# Patient Record
Sex: MALE | Race: WHITE | NOT HISPANIC OR LATINO | Employment: UNEMPLOYED | URBAN - NONMETROPOLITAN AREA
[De-identification: names, ages, dates, MRNs, and addresses within clinical notes are randomized per-mention and may not be internally consistent; named-entity substitution may affect disease eponyms.]

---

## 2020-02-19 ENCOUNTER — OFFICE VISIT (OUTPATIENT)
Dept: ORTHOPEDIC SURGERY | Facility: CLINIC | Age: 70
End: 2020-02-19

## 2020-02-19 VITALS — WEIGHT: 295 LBS | BODY MASS INDEX: 39.96 KG/M2 | TEMPERATURE: 97.4 F | HEIGHT: 72 IN

## 2020-02-19 DIAGNOSIS — M17.0 PRIMARY OSTEOARTHRITIS OF BOTH KNEES: Primary | ICD-10-CM

## 2020-02-19 PROCEDURE — 99204 OFFICE O/P NEW MOD 45 MIN: CPT | Performed by: PHYSICIAN ASSISTANT

## 2020-02-19 RX ORDER — SITAGLIPTIN 100 MG/1
100 TABLET, FILM COATED ORAL DAILY
COMMUNITY
Start: 2020-01-02

## 2020-02-19 RX ORDER — CARVEDILOL 3.12 MG/1
3.12 TABLET ORAL 2 TIMES DAILY WITH MEALS
COMMUNITY

## 2020-02-19 RX ORDER — CITALOPRAM 40 MG/1
TABLET ORAL
COMMUNITY
Start: 2020-02-07

## 2020-02-19 RX ORDER — TIZANIDINE 4 MG/1
TABLET ORAL
COMMUNITY
Start: 2020-02-09

## 2020-02-19 RX ORDER — GABAPENTIN 600 MG/1
TABLET ORAL
COMMUNITY
Start: 2020-01-27

## 2020-02-19 RX ORDER — CANDESARTAN CILEXETIL AND HYDROCHLOROTHIAZIDE 32; 25 MG/1; MG/1
1 TABLET ORAL DAILY
COMMUNITY
Start: 2020-01-31

## 2020-02-19 NOTE — PROGRESS NOTES
{AS New Follow Up Note Header:27089}    Patient: Kyler Kauffman  ?  YOB: 1950    MRN: [id number]  ?  Chief Complaint   Patient presents with   • Right Knee - Establish Care, Pain   • Left Knee - Establish Care, Pain      ?  HPI:   Kyler Lazar      Pain Location: {[facility] body part:20204}  Radiation: {asradiationpain:76759}  Quality: {asquality:91356}  Intensity/Severity: {asseveritypain:32901}  Duration: {Time:23956}  Onset quality: {asonsetquality:23204}  Timing: {astimin}  Aggravating Factors: {AS Aggravating Factors Ortho:72569}  Alleviating Factors: {AS Alleviating Factors:42738}  Previous Episodes: {aspreviousepisodes:79930}  Associated Symptoms: {asassociatedsymptoms:51535}  ADLs Affected: {ADL ASMEH:63731}  Previous Treatment: ***    This patient is {new/est pt:2644725089}.  This problem {AS IS/NOT:59013} new to this examiner.      Allergies: Allergies not on file    Medications:   Home Medications:  Current Outpatient Medications on File Prior to Visit   Medication Sig   • Candesartan Cilexetil-HCTZ 32-25 MG tablet Take 1 tablet by mouth Daily.   • carvedilol (COREG) 3.125 MG tablet Take 3.125 mg by mouth 2 (Two) Times a Day With Meals.   • citalopram (CeleXA) 40 MG tablet    • folic acid-vit B6-vit B12 (FOLTABS) 0.8-10-0.115 MG tablet tablet Take  by mouth Daily.   • gabapentin (NEURONTIN) 600 MG tablet TAKE 1 & 1 2 (ONE & ONE HALF) TABLETS BY MOUTH THREE TIMES DAILY   • JANUVIA 100 MG tablet Take 100 mg by mouth Daily.   • metFORMIN (GLUCOPHAGE) 1000 MG tablet Take 1,000 mg by mouth 2 (Two) Times a Day With Meals.   • tiZANidine (ZANAFLEX) 4 MG tablet      No current facility-administered medications on file prior to visit.      Current Medications:  Scheduled Meds:  PRN Meds:.    I have reviewed the patient's medical history in detail and updated the computerized patient record.  Review and summarization of old records include:    Past Medical History:   Diagnosis  "Date   • Back pain    • Diabetes (CMS/HCC)    • Neck pain      Past Surgical History:   Procedure Laterality Date   • APPENDECTOMY  1968   • HAND SURGERY  2005     Social History     Occupational History   • Not on file   Tobacco Use   • Smoking status: Never Smoker   Substance and Sexual Activity   • Alcohol use: Never     Frequency: Never   • Drug use: Not on file   • Sexual activity: Not on file      No family history on file.      Review of Systems       Wt Readings from Last 3 Encounters:   02/19/20 134 kg (295 lb)     Ht Readings from Last 3 Encounters:   02/19/20 182.9 cm (72\")     Body mass index is 40.01 kg/m².  Facility age limit for growth percentiles is 20 years.  Vitals:    02/19/20 1039   Temp: 97.4 °F (36.3 °C)         Physical Exam  ***      Ortho Exam:   {Musculoskeletal Exams:41568}    {Post Op Total Joint Arthroplasty Exam:15666}    {Post Op Detailed Exam:82627}      Diagnostics:  {Diagnostics options AS:67813}      Assessment:  There are no diagnoses linked to this encounter.      Procedures  ?    Plan    · Compression/brace  · Rest, ice, compression, and elevation (RICE) therapy  · Stretching and strengthening exercises  · {OTC pain:20513}  · Follow up in *** {WEEKS/MONTHS:70428}    Date of encounter: 02/19/2020   Roxie Torres MA  "

## 2020-02-28 PROBLEM — E11.9 DIABETES MELLITUS: Status: ACTIVE | Noted: 2019-08-30

## 2020-02-28 PROBLEM — M17.0 PRIMARY OSTEOARTHRITIS OF BOTH KNEES: Status: ACTIVE | Noted: 2020-02-28

## 2020-02-28 RX ORDER — OMEPRAZOLE 40 MG/1
CAPSULE, DELAYED RELEASE ORAL
COMMUNITY

## 2020-02-28 RX ORDER — ALBUTEROL SULFATE 2.5 MG/3ML
3 SOLUTION RESPIRATORY (INHALATION) EVERY 8 HOURS SCHEDULED
COMMUNITY

## 2020-02-28 RX ORDER — POTASSIUM CHLORIDE 20 MEQ/1
20 TABLET, EXTENDED RELEASE ORAL DAILY
COMMUNITY
Start: 2020-01-31

## 2020-02-28 RX ORDER — VALSARTAN AND HYDROCHLOROTHIAZIDE 320; 25 MG/1; MG/1
TABLET, FILM COATED ORAL
COMMUNITY
End: 2022-03-01 | Stop reason: ALTCHOICE

## 2020-02-28 RX ORDER — RANITIDINE 300 MG/1
TABLET ORAL
COMMUNITY
End: 2022-03-01 | Stop reason: ALTCHOICE

## 2020-02-28 RX ORDER — FUROSEMIDE 40 MG/1
TABLET ORAL
COMMUNITY

## 2020-02-28 NOTE — PROGRESS NOTES
NEW VISIT    Patient: Kyler Kauffman  ?  YOB: 1950    MRN: [id number]  ?  Chief Complaint   Patient presents with   • Right Knee - Establish Care, Pain   • Left Knee - Establish Care, Pain      ?  HPI:   69 y.o. male presents with bilateral knee pain for many years. He reports he has adjusted his way of like because of his knee pain. He would like to be able to walk to his garden and to his mailbox without difficulty. He does state he is unable to fully extend the knees due to the way he has walked for so many years, it has always been more comfortable to walk with a bend in his knees.    Pain Location: bilateral knee (right worse than left)  Radiation: medial face of tibia  Quality: grinding  Intensity/Severity: severe  Duration: 10 years  Progression of symptoms: yes, progressive worsening over the last 5 years  Onset quality: gradual   Timing: intermittent  Aggravating Factors: rising after sitting, walking, standing  Alleviating Factors: rest  Previous Episodes: yes  Associated Symptoms: pain, clicking/popping, decreased ROM  ADLs Affected: ambulating, recreational activities/sports  Previous Treatment: NSAIDs      This patient is a new patient.  This problem is new to this examiner.      Allergies: Allergies not on file    Medications:   Home Medications:  Current Outpatient Medications on File Prior to Visit   Medication Sig   • Candesartan Cilexetil-HCTZ 32-25 MG tablet Take 1 tablet by mouth Daily.   • carvedilol (COREG) 3.125 MG tablet Take 3.125 mg by mouth 2 (Two) Times a Day With Meals.   • citalopram (CeleXA) 40 MG tablet    • folic acid-vit B6-vit B12 (FOLTABS) 0.8-10-0.115 MG tablet tablet Take  by mouth Daily.   • gabapentin (NEURONTIN) 600 MG tablet TAKE 1 & 1 2 (ONE & ONE HALF) TABLETS BY MOUTH THREE TIMES DAILY   • JANUVIA 100 MG tablet Take 100 mg by mouth Daily.   • metFORMIN (GLUCOPHAGE) 1000 MG tablet Take 1,000 mg by mouth 2 (Two) Times a Day With Meals.   • tiZANidine  (ZANAFLEX) 4 MG tablet    • albuterol (PROVENTIL) (2.5 MG/3ML) 0.083% nebulizer solution 3 mL Every 8 (Eight) Hours.   • furosemide (LASIX) 40 MG tablet Lasix 40 mg tablet   Take 1 tablet every day by oral route.   • hydroCHLOROthiazide (MICROZIDE PO) hydrochlorothiazide   • mometasone-formoterol (DULERA) 100-5 MCG/ACT inhaler Every 12 (Twelve) Hours.   • omeprazole (priLOSEC) 40 MG capsule omeprazole 40 mg capsule,delayed release   Take 1 capsule every day by oral route as needed.   • potassium chloride (K-DUR,KLOR-CON) 20 MEQ CR tablet Take 20 mEq by mouth Daily.   • raNITIdine (ZANTAC) 300 MG tablet Zantac 300 mg tablet   Take 1 tablet every day by oral route as needed.   • valsartan-hydrochlorothiazide (DIOVAN HCT) 320-25 MG per tablet Diovan  mg-25 mg tablet   Take 1 tablet every day by oral route.     No current facility-administered medications on file prior to visit.      Current Medications:  Scheduled Meds:  PRN Meds:.    I have reviewed the patient's medical history in detail and updated the computerized patient record.  Review and summarization of old records include:    Past Medical History:   Diagnosis Date   • Back pain    • Diabetes (CMS/HCC)    • Neck pain    • Neuropathy      Past Surgical History:   Procedure Laterality Date   • APPENDECTOMY  1968   • HAND SURGERY  2005     Social History     Occupational History   • Not on file   Tobacco Use   • Smoking status: Never Smoker   Substance and Sexual Activity   • Alcohol use: Never     Frequency: Never   • Drug use: Not on file   • Sexual activity: Not on file     No family history on file.      Review of Systems  Constitutional: Negative.  Negative for fever.   Eyes: Negative.    Respiratory: Negative.    Cardiovascular: Negative.    Endocrine: Negative.    Musculoskeletal: Positive for arthralgias, gait problem and joint swelling.   Skin: Negative.  Negative for rash and wound.   Allergic/Immunologic: Negative.    Neurological: Negative for  "numbness.   Hematological: Negative.    Psychiatric/Behavioral: Negative.         Wt Readings from Last 3 Encounters:   02/19/20 134 kg (295 lb)     Ht Readings from Last 3 Encounters:   02/19/20 182.9 cm (72\")     Body mass index is 40.01 kg/m².  Facility age limit for growth percentiles is 20 years.  Vitals:    02/19/20 1039   Temp: 97.4 °F (36.3 °C)         Physical Exam  Constitutional: Patient is oriented to person, place, and time. Appears well-developed and well-nourished.   HENT:   Head: Normocephalic and atraumatic.   Eyes: Conjunctivae and EOM are normal. Pupils are equal, round, and reactive to light.   Cardiovascular: Normal rate and intact distal pulses.   Pulmonary/Chest: Effort normal and breath sounds normal.   Musculoskeletal:   See detailed exam below   Neurological: Alert and oriented to person, place, and time. No sensory deficit. Coordination normal.   Skin: Skin is warm and dry. Capillary refill takes less than 2 seconds. No rash noted. No erythema.   Psychiatric: Patient has a normal mood and affect. His behavior is normal. Judgment and thought content normal.   Nursing note and vitals reviewed.      Ortho Exam:   There is moderate joint line tenderness at the medial aspect of the knee. The knee has a varus orientation.   Positive for crepitus throughout range of motion.   Positive for mild effusion.  Findings are consistent with synovitis and effusion.    Positive patellar grind test.   Negative Lachman test.    Negative anterior and posterior drawer.  Range of motion in extension and flexion is: right 5-100, left 5-105 degrees.  Neurovascular status is intact.  Dorsalis pedis and posterior tibial artery pulses are palpable. Common peroneal nerve function is well preserved.   Gait is cautious and antalgic.  Positive for Baker's Cyst with fullness and tenderness in the popliteal fossa.         Diagnostics:  Independently viewed and interpreted outside xrays of right knee and left knee, from " [facility] 1/13/19 and 1/13/20, my impression below:  Right Knee: KL grade 4, advanced tricompartmental osteoarthritis with complete loss of medial joint space and multiple areas of osteophyte formation  Left Knee: KL grade 4, significant osteophyte formation in the posterior aspect of the knee and in the patellofemoral compartment.  There is mild medial joint space narrowing.        Assessment:  Kyler was seen today for establish care, pain, establish care and pain.    Diagnoses and all orders for this visit:    Primary osteoarthritis of both knees  -     Visco Treatment; Future        Plan    · Risk, benefits, and merits of treatment alternatives reviewed with the patient and/or guardian and questions answered  · Regular exercise as tolerated  · Ice, heat, and/or modalities as beneficial  · Weight bearing parameters reviewed  · Discussed joint injection with steroid and monovisc.    · Dicussed bracing and fitted for knee brace in office.  · Discussed surgical options with TKA bilaterally. He would like to try conservative measures with monovisc injection first, which I agree with. Will request insurance approval for Monovisc.  · Rest, ice, compression, and elevation (RICE) therapy  · Alternate OTC Ibuprofen and Tylenol as needed/if clinically indicated  · Once Monovisc is approved      Shayan Donnelly PA-C  Date of encounter: 02/19/2020     Electronically signed by Shayan Donnelly PA-C, 02/28/20, 9:36 AM.

## 2020-03-05 ENCOUNTER — TELEPHONE (OUTPATIENT)
Dept: ORTHOPEDIC SURGERY | Facility: CLINIC | Age: 70
End: 2020-03-05

## 2020-03-05 NOTE — TELEPHONE ENCOUNTER
PATIENT CALLED CHECKING STATUS OF VISCO APPROVAL THROUGH [facility] PHONE NUMBER [phone] ext [phone]

## 2020-03-06 NOTE — TELEPHONE ENCOUNTER
I TRIED TO CALL PAT BACK AND THE PHONE WAS OUT OF SERVICE  I HAVE SENT IN A PA FOR AN APPROVAL FOR THE MONOVISC INJECTIONS JUST WAITING ON A REPLY AND VERIFICATION IT IS COVERED

## 2020-03-10 ENCOUNTER — TELEPHONE (OUTPATIENT)
Dept: ORTHOPEDIC SURGERY | Facility: CLINIC | Age: 70
End: 2020-03-10

## 2020-03-10 NOTE — TELEPHONE ENCOUNTER
PATIENT CALLED AND HE HAS BEEN SCHEDULED FOR AN INJECTION ON 3/16/20.  HIS PHONE NUMBER HAS BEEN UPDATED

## 2020-03-10 NOTE — TELEPHONE ENCOUNTER
I TRIED TO CALL THE PATIENT TO LET HIM KNOW THE MONOVISC INJECTION WAS APPROVED AND SET HIM UP AN APPOINTMENT BUT IT SAYS THE PHONE IS NO LONGER IN SERVICE

## 2020-03-16 ENCOUNTER — CLINICAL SUPPORT (OUTPATIENT)
Dept: ORTHOPEDIC SURGERY | Facility: CLINIC | Age: 70
End: 2020-03-16

## 2020-03-16 VITALS — BODY MASS INDEX: 40.36 KG/M2 | WEIGHT: 298 LBS | HEIGHT: 72 IN | TEMPERATURE: 97.3 F

## 2020-03-16 DIAGNOSIS — M17.11 PRIMARY OSTEOARTHRITIS OF RIGHT KNEE: Primary | ICD-10-CM

## 2020-03-16 DIAGNOSIS — M17.12 PRIMARY OSTEOARTHRITIS OF LEFT KNEE: ICD-10-CM

## 2020-03-16 PROCEDURE — 20610 DRAIN/INJ JOINT/BURSA W/O US: CPT | Performed by: PHYSICIAN ASSISTANT

## 2020-03-16 RX ORDER — LIDOCAINE HYDROCHLORIDE 10 MG/ML
2 INJECTION, SOLUTION EPIDURAL; INFILTRATION; INTRACAUDAL; PERINEURAL
Status: COMPLETED | OUTPATIENT
Start: 2020-03-16 | End: 2020-03-16

## 2020-03-16 RX ORDER — GLIPIZIDE 10 MG/1
TABLET ORAL
COMMUNITY

## 2020-03-16 RX ADMIN — LIDOCAINE HYDROCHLORIDE 2 ML: 10 INJECTION, SOLUTION EPIDURAL; INFILTRATION; INTRACAUDAL; PERINEURAL at 10:33

## 2020-03-16 NOTE — PROGRESS NOTES
Procedure   Large Joint Arthrocentesis: R knee  Date/Time: 3/16/2020 10:33 AM  Consent given by: patient  Site marked: site marked  Timeout: Immediately prior to procedure a time out was called to verify the correct patient, procedure, equipment, support staff and site/side marked as required   Supporting Documentation  Indications: pain and joint swelling   Procedure Details  Location: knee - R knee  Preparation: Patient was prepped and draped in the usual sterile fashion  Needle size: 18 G  Approach: anteromedial  Medications administered: 2 mL lidocaine PF 1% 1 %; 88 mg Hyaluronan 88 MG/4ML  Patient tolerance: patient tolerated the procedure well with no immediate complications    Large Joint Arthrocentesis: L knee  Date/Time: 3/16/2020 10:33 AM  Consent given by: patient  Site marked: site marked  Timeout: Immediately prior to procedure a time out was called to verify the correct patient, procedure, equipment, support staff and site/side marked as required   Supporting Documentation  Indications: pain and joint swelling   Procedure Details  Location: knee - L knee  Preparation: Patient was prepped and draped in the usual sterile fashion  Needle size: 18 G  Approach: anteromedial  Medications administered: 2 mL lidocaine PF 1% 1 %; 88 mg Hyaluronan 88 MG/4ML  Patient tolerance: patient tolerated the procedure well with no immediate complications      Electronically signed by Shayan Donnelly PA-C, 03/16/20, 10:38 AM.

## 2020-03-16 NOTE — PROGRESS NOTES
INJECTION      Patient: Kyler Kauffman    MRN: [id number]    YOB: 1950    Chief Complaint   Patient presents with   • Right Knee - Follow-up, Pain   • Left Knee - Follow-up, Pain   • Injections         History of Present Illness:  Kyler Kauffman is here today for injection therapy. He is receiving first time  bilateral knee injections. At my initial visit with him on 2/19/2020 we discussed treatment options for his knee pain and decided upon trying Monovisc injections. Options have been discussed and he understands and consents.       Physical Exam:   69 y.o. male awake, alert, oriented, in no acute distress and well developed, well nourished.  There is moderate joint line tenderness at the medial aspect of the knee. The knee has a varus orientation.   Positive for crepitus throughout range of motion.   Positive for mild effusion.  Findings are consistent with synovitis and effusion.    Positive patellar grind test.   Negative Lachman test.    Negative anterior and posterior drawer.  Range of motion in extension and flexion is: 5-100 (right), 5-105 (left) degrees.  Neurovascular status is intact.  Dorsalis pedis and posterior tibial artery pulses are palpable. Common peroneal nerve function is well preserved.   Gait is cautious and antalgic.  Positive for Baker's Cyst with fullness and tenderness in the popliteal fossa.       Procedure:  See separate procedure note    Injection site was identified by physical examination and cleaned with Betadine and alcohol swabs. Prior to needle insertion, ethyl chloride spray was used for surface anesthesia. Sterile technique was used.       ASSESSMENT:  Kyler was seen today for injections, follow-up, pain, follow-up and pain.    Diagnoses and all orders for this visit:    Primary osteoarthritis of right knee  -     Large Joint Arthrocentesis: R knee  -     lidocaine PF 1% (XYLOCAINE) injection 2 mL  -     Hyaluronan (MONOVISC) injection 88 mg  -      Visco Treatment; Future    Primary osteoarthritis of left knee  -     Large Joint Arthrocentesis: L knee  -     lidocaine PF 1% (XYLOCAINE) injection 2 mL  -     Hyaluronan (MONOVISC) injection 88 mg  -     Visco Treatment; Future          PLAN:   • Bilateral knee Monovisc injection was discussed with the patient. Discussed indication, risks, benefits, and alternatives. Verbal consent was given to proceed with the procedure.   • Injection was performed from anteromedial approach.  Patient tolerated the procedure well and no complications were encountered.  • Discussion of orthopedic goals and activities and patient/guardian expressed understanding.  • Ice, heat, rest, compression and elevation of extremity as beneficial  • nsaids and/or tylenol as beneficial  • Instructed to refrain from heavy activity/rest the extremity for the next 24-48 hours  • Discussion regarding possibility of cortisol flare and what to expect if this occurs  • Watch for signs and symptoms of infection  • Call if adverse effect from injection therapy  • Follow up in 6 months unless he does not experience relief with the injections at which point we will decide to change to steroid injections    Shayan Donnelly PA-C  Encounter Date: 3/16/2020    Electronically signed by Shayan Donnelly PA-C, 03/16/20, 10:39 AM.      EMR Dragon/Transcription disclaimer:  Much of this encounter note is an electronic transcription/translation of spoken language to printed text. The electronic translation of spoken language may permit erroneous, or at times, nonsensical words or phrases to be inadvertently transcribed; Although I have reviewed the note for such errors, some may still exist.

## 2020-09-21 ENCOUNTER — CLINICAL SUPPORT (OUTPATIENT)
Dept: ORTHOPEDIC SURGERY | Facility: CLINIC | Age: 70
End: 2020-09-21

## 2020-09-21 VITALS — TEMPERATURE: 96.4 F | WEIGHT: 280 LBS | HEIGHT: 72 IN | BODY MASS INDEX: 37.93 KG/M2

## 2020-09-21 DIAGNOSIS — M17.11 PRIMARY OSTEOARTHRITIS OF RIGHT KNEE: Primary | ICD-10-CM

## 2020-09-21 DIAGNOSIS — M17.12 PRIMARY OSTEOARTHRITIS OF LEFT KNEE: ICD-10-CM

## 2020-09-21 PROCEDURE — 20610 DRAIN/INJ JOINT/BURSA W/O US: CPT | Performed by: PHYSICIAN ASSISTANT

## 2020-09-21 RX ORDER — HYDROCODONE BITARTRATE AND ACETAMINOPHEN 10; 325 MG/1; MG/1
TABLET ORAL
COMMUNITY
Start: 2020-06-19 | End: 2022-03-01 | Stop reason: ALTCHOICE

## 2020-09-21 RX ORDER — LIDOCAINE HYDROCHLORIDE 10 MG/ML
2 INJECTION, SOLUTION INFILTRATION; PERINEURAL
Status: COMPLETED | OUTPATIENT
Start: 2020-09-21 | End: 2020-09-21

## 2020-09-21 RX ADMIN — LIDOCAINE HYDROCHLORIDE 2 ML: 10 INJECTION, SOLUTION INFILTRATION; PERINEURAL at 09:12

## 2020-09-21 RX ADMIN — LIDOCAINE HYDROCHLORIDE 2 ML: 10 INJECTION, SOLUTION INFILTRATION; PERINEURAL at 09:13

## 2020-09-21 NOTE — PROGRESS NOTES
Procedure   Large Joint Arthrocentesis: R knee  Date/Time: 9/21/2020 9:12 AM  Consent given by: patient  Site marked: site marked  Timeout: Immediately prior to procedure a time out was called to verify the correct patient, procedure, equipment, support staff and site/side marked as required   Supporting Documentation  Indications: pain and joint swelling   Procedure Details  Location: knee - R knee  Preparation: Patient was prepped and draped in the usual sterile fashion  Needle size: 18 G  Approach: anteromedial  Medications administered: 88 mg Hyaluronan 88 MG/4ML; 2 mL lidocaine 1 %  Patient tolerance: patient tolerated the procedure well with no immediate complications    Large Joint Arthrocentesis: L knee  Date/Time: 9/21/2020 9:13 AM  Consent given by: patient  Site marked: site marked  Timeout: Immediately prior to procedure a time out was called to verify the correct patient, procedure, equipment, support staff and site/side marked as required   Supporting Documentation  Indications: pain and joint swelling   Procedure Details  Location: knee - L knee  Preparation: Patient was prepped and draped in the usual sterile fashion  Needle size: 18 G  Approach: anteromedial  Medications administered: 88 mg Hyaluronan 88 MG/4ML; 2 mL lidocaine 1 %  Patient tolerance: patient tolerated the procedure well with no immediate complications      Electronically signed by Shayan Donnelly PA-C, 09/21/20, 10:30 AM EDT.

## 2020-09-21 NOTE — PROGRESS NOTES
INJECTION      Patient: Kyler Kauffman    MRN: [id number]    YOB: 1950    Chief Complaint   Patient presents with   • Left Knee - Follow-up, Pain   • Right Knee - Follow-up, Pain   • Injections         History of Present Illness:  Kyler Kauffman is here today for injection therapy. He is receiving second time bilateral knee injections of Monovisc.  He reports significant improvement with the injections and states they started to wear off about 1 month ago.  Options have been discussed and he understands and consents.       Physical Exam:   69 y.o. male awake, alert, oriented, in no acute distress and well developed, well nourished.  BILATERAL knee  · There is moderate joint line tenderness at the medial aspect of both knees.   · Positive for crepitus throughout range of motion.   · Positive for mild effusion.  Findings are consistent with synovitis and effusion.    · Positive patellar grind test.   · Negative Lachman test.    · Negative anterior and posterior drawer.  · Range of motion in extension and flexion is: 5-100 (right), 5-105 (left) degrees.  · Neurovascular status is intact.  Dorsalis pedis and posterior tibial artery pulses are palpable. Common peroneal nerve function is well preserved.   · Gait is cautious and antalgic.  · Positive for Baker's Cyst with fullness and tenderness in the popliteal fossa.   The above information is historical, all information remains the same after examining today.      Procedure:  See separate procedure note  Injection site was identified by physical examination and cleaned with Betadine and alcohol swabs. Prior to needle insertion, ethyl chloride spray was used for surface anesthesia. Sterile technique was used.       ASSESSMENT:  Kyler was seen today for injections, follow-up, pain, follow-up and pain.    Diagnoses and all orders for this visit:    Primary osteoarthritis of right knee  -     Visco Treatment; Future    Primary osteoarthritis of left  knee  -     Visco Treatment; Future    Other orders  -     Large Joint Arthrocentesis: R knee  -     Large Joint Arthrocentesis: L knee          PLAN:   • Bilateral knee Monovisc injections were discussed with the patient. Discussed indication, risks, benefits, and alternatives. Verbal consent was given to proceed with the procedure.   • Injections were performed from anteromedial approach.  Patient tolerated the procedures well and no complications were encountered.  • Discussion of orthopedic goals and activities and patient/guardian expressed understanding.  • Ice, heat, rest, compression and elevation of extremity as beneficial  • nsaids and/or tylenol as beneficial  • Instructed to refrain from heavy activity/rest the extremity for the next 24-48 hours  • Discussion regarding possibility of cortisol flare and what to expect if this occurs  • Watch for signs and symptoms of infection  • Call if adverse effect from injection therapy  · Follow up in 6 months for Monovisc    Shayan Donnelly PA-C  Encounter Date: 9/21/2020  Electronically signed by Shayan Donnelly PA-C, 09/21/20, 10:29 AM EDT.          EMR Dragon/Transcription disclaimer:  Much of this encounter note is an electronic transcription/translation of spoken language to printed text. The electronic translation of spoken language may permit erroneous, or at times, nonsensical words or phrases to be inadvertently transcribed; Although I have reviewed the note for such errors, some may still exist.

## 2021-03-16 ENCOUNTER — TELEPHONE (OUTPATIENT)
Dept: ORTHOPEDIC SURGERY | Facility: CLINIC | Age: 71
End: 2021-03-16

## 2021-03-16 NOTE — TELEPHONE ENCOUNTER
Caller:  LEENA CANSECOTINGLY     Relationship: SELF    Best call back number: [phone]    Additional notes: PATIENT WOULD LIKE A CALL BACK TO DISCUSS INJECTION APPT ON 3-29-21.  HE NEEDS TO CONFIRM IF PRACTICE HAS [facility] AUTHORIZATION ON FILE, AND IF NOT, HE WILL NEED TO RESCHEDULE.  [facility] N[facility]PT WITH [facility] IS ON 4-13-21

## 2021-03-16 NOTE — TELEPHONE ENCOUNTER
CALLED PATIENT AND HAVE RESCHEDULED HIS APPOINTMENT TO 5/04/21 AND PATIENT WILL OBTAIN UPDATED REFERRAL [facility]

## 2021-05-04 ENCOUNTER — CLINICAL SUPPORT (OUTPATIENT)
Dept: ORTHOPEDIC SURGERY | Facility: CLINIC | Age: 71
End: 2021-05-04

## 2021-05-04 VITALS — TEMPERATURE: 97.5 F | WEIGHT: 290 LBS | HEIGHT: 72 IN | BODY MASS INDEX: 39.28 KG/M2

## 2021-05-04 DIAGNOSIS — M17.12 PRIMARY OSTEOARTHRITIS OF LEFT KNEE: ICD-10-CM

## 2021-05-04 DIAGNOSIS — M17.11 PRIMARY OSTEOARTHRITIS OF RIGHT KNEE: ICD-10-CM

## 2021-05-04 PROCEDURE — 20610 DRAIN/INJ JOINT/BURSA W/O US: CPT | Performed by: PHYSICIAN ASSISTANT

## 2021-05-04 RX ORDER — MELOXICAM 7.5 MG/1
TABLET ORAL
COMMUNITY
Start: 2021-03-31

## 2021-05-04 RX ORDER — NAPROXEN 375 MG/1
375 TABLET ORAL 2 TIMES DAILY PRN
COMMUNITY

## 2021-05-04 NOTE — PROGRESS NOTES
"Chief Complaint  Follow-up and Pain of the Right Knee and Follow-up and Pain of the Left Knee    Subjective    History of Present Illness      Kyler Kauffman is a 70 y.o. male who presents to [facility] for is here today for injection therapy. He receives  BILATERAL knee injections of Monovisc. Since last injection, patient notes substantial relief of symptoms.  It has been greater than 6 months since his last injection therefore he is having significant discomfort today.  Treatment options have been discussed and he understands and consents.       Objective   Vital Signs:   Temp 97.5 °F (36.4 °C)   Ht 182.9 cm (72\")   Wt 132 kg (290 lb)   BMI 39.33 kg/m²     Physical Exam  70 y.o. male is awake, alert, oriented, in no acute distress and well developed, well nourished.  Ortho Exam   BILATERAL knee  · There is moderate joint line tenderness at the medial aspect of both knees.   · Positive for crepitus throughout range of motion.   · Positive for mild effusion.  Findings are consistent with synovitis and effusion.    · Positive patellar grind test.   · Negative Lachman test.    · Negative anterior and posterior drawer.  · Range of motion in extension and flexion is: 5-100 (right), 5-105 (left) degrees.  · Neurovascular status is intact.  Dorsalis pedis and posterior tibial artery pulses are palpable. Common peroneal nerve function is well preserved.   · Gait is cautious and antalgic.  · Positive for Baker's Cyst with fullness and tenderness in the popliteal fossa.         Result Review :         PROCEDURE  Procedures     Injection site was identified by physical examination and cleaned with Betadine and alcohol swabs. Prior to needle insertion, ethyl chloride spray was used for surface anesthesia. Sterile technique was used.       Assessment   Assessment and Plan    Problem List Items Addressed This Visit        Musculoskeletal and Injuries    Primary osteoarthritis of right knee "    Relevant Orders    Large Joint Arthrocentesis: R knee    Visco Treatment    Primary osteoarthritis of left knee    Relevant Orders    Large Joint Arthrocentesis: L knee    Visco Treatment          Follow Up   · Bilateral knee Monovisc injections were discussed with the patient. Discussed indication, risks, benefits, and alternatives. Verbal consent was given to proceed with the procedure.   · Injections were performed from anteromedial approach.  Patient tolerated the procedures well and no complications were encountered.  · Discussion of orthopedic goals and activities and patient/guardian expressed understanding.  · Ice, heat, rest, compression and elevation of extremity as beneficial  · nsaids and/or tylenol as beneficial  · Instructed to refrain from heavy activity/rest the extremity for the next 24-48 hours  · Discussion regarding possibility of cortisol flare and what to expect if this occurs  · Watch for signs and symptoms of infection  · Call if adverse effect from injection therapy  · Follow up in 6 months for Monovisc  • Patient was given instructions and counseling regarding his condition or for health maintenance advice. Please see specific information pulled into the AVS if appropriate.     Shayan Donnelly PA-C   Date of Encounter: 5/4/2021   Electronically signed by Shayan Donnelly PA-C, 05/04/21, 10:57 AM EDT.     EMR Dragon/Transcription disclaimer:  Much of this encounter note is an electronic transcription/translation of spoken language to printed text. The electronic translation of spoken language may permit erroneous, or at times, nonsensical words or phrases to be inadvertently transcribed; Although I have reviewed the note for such errors, some may still exist.

## 2021-05-04 NOTE — PROGRESS NOTES
Large Joint Arthrocentesis: R knee  Date/Time: 5/4/2021 10:38 AM  Consent given by: patient  Site marked: site marked  Timeout: Immediately prior to procedure a time out was called to verify the correct patient, procedure, equipment, support staff and site/side marked as required   Supporting Documentation  Indications: pain   Procedure Details  Location: knee - R knee  Preparation: Patient was prepped and draped in the usual sterile fashion  Needle size: 25 G  Approach: anteromedial  Medications administered: 88 mg Hyaluronan 88 MG/4ML; 2 mL lidocaine (cardiac)  Patient tolerance: patient tolerated the procedure well with no immediate complications    Large Joint Arthrocentesis: L knee  Date/Time: 5/4/2021 10:39 AM  Consent given by: patient  Site marked: site marked  Timeout: Immediately prior to procedure a time out was called to verify the correct patient, procedure, equipment, support staff and site/side marked as required   Supporting Documentation  Indications: pain   Procedure Details  Location: knee - L knee  Preparation: Patient was prepped and draped in the usual sterile fashion  Needle size: 25 G  Approach: anteromedial  Medications administered: 88 mg Hyaluronan 88 MG/4ML; 2 mL lidocaine (cardiac)  Patient tolerance: patient tolerated the procedure well with no immediate complications      Electronically signed by Shayan Donnelly PA-C, 05/04/21, 10:57 AM EDT.

## 2021-11-08 ENCOUNTER — CLINICAL SUPPORT (OUTPATIENT)
Dept: ORTHOPEDIC SURGERY | Facility: CLINIC | Age: 71
End: 2021-11-08

## 2021-11-08 VITALS — BODY MASS INDEX: 39.28 KG/M2 | WEIGHT: 290 LBS | HEIGHT: 72 IN | TEMPERATURE: 97.8 F

## 2021-11-08 DIAGNOSIS — M17.12 PRIMARY OSTEOARTHRITIS OF LEFT KNEE: ICD-10-CM

## 2021-11-08 DIAGNOSIS — M17.11 PRIMARY OSTEOARTHRITIS OF RIGHT KNEE: ICD-10-CM

## 2021-11-08 PROCEDURE — 20610 DRAIN/INJ JOINT/BURSA W/O US: CPT | Performed by: PHYSICIAN ASSISTANT

## 2021-11-08 NOTE — PROGRESS NOTES
"Chief Complaint  Follow-up and Pain of the Left Knee, Follow-up and Pain of the Right Knee, and Injections    Subjective    History of Present Illness      Kyler Kauffman is a 71 y.o. male who presents to [facility] for is here today for injection therapy. He receives  BILATERAL knee injections of Monovisc. Since last injection, patient notes substantial relief of symptoms but only for about 4 months. Treatment options have been discussed and he understands and consents.       Objective   Vital Signs:   Temp 97.8 °F (36.6 °C)   Ht 182.9 cm (72.01\")   Wt 132 kg (290 lb)   BMI 39.32 kg/m²     Physical Exam  71 y.o. male is awake, alert, oriented, in no acute distress and well developed, well nourished.  Ortho Exam   BILATERAL knee  There is moderate joint line tenderness at the medial aspect of both knees.   Positive for crepitus throughout range of motion.   Positive for mild effusion.  Findings are consistent with synovitis and effusion.    Positive patellar grind test.   Negative Lachman test.    Negative anterior and posterior drawer.  Range of motion in extension and flexion is: 5-100 (right), 5-105 (left) degrees.  Neurovascular status is intact.  Dorsalis pedis and posterior tibial artery pulses are palpable.   Common peroneal nerve function is well preserved.   Gait is cautious and antalgic.  Positive for Baker's Cyst with fullness and tenderness in the popliteal fossa.      Result Review :           PROCEDURE  Large Joint Arthrocentesis: R knee  Date/Time: 11/8/2021 9:36 AM  Consent given by: patient  Site marked: site marked  Timeout: Immediately prior to procedure a time out was called to verify the correct patient, procedure, equipment, support staff and site/side marked as required   Supporting Documentation  Indications: pain and joint swelling   Procedure Details  Location: knee - R knee  Preparation: Patient was prepped and draped in the usual sterile " fashion  Needle size: 18 G  Approach: anteromedial  Medications administered: 88 mg Hyaluronan 88 MG/4ML; 2 mL lidocaine (cardiac)  Patient tolerance: patient tolerated the procedure well with no immediate complications    Large Joint Arthrocentesis: L knee  Date/Time: 11/8/2021 9:37 AM  Consent given by: patient  Site marked: site marked  Timeout: Immediately prior to procedure a time out was called to verify the correct patient, procedure, equipment, support staff and site/side marked as required   Supporting Documentation  Indications: pain and joint swelling   Procedure Details  Location: knee - L knee  Preparation: Patient was prepped and draped in the usual sterile fashion  Needle size: 18 G  Approach: anteromedial  Medications administered: 88 mg Hyaluronan 88 MG/4ML; 2 mL lidocaine (cardiac)  Patient tolerance: patient tolerated the procedure well with no immediate complications        Injection site was identified by physical examination and cleaned with Betadine and alcohol swabs. Prior to needle insertion, ethyl chloride spray was used for surface anesthesia. Sterile technique was used.       Assessment   Assessment and Plan    Problem List Items Addressed This Visit        Musculoskeletal and Injuries    Primary osteoarthritis of right knee    Relevant Medications    Hyaluronan (MONOVISC) injection 88 mg (Completed)    lidocaine (cardiac) (XYLOCAINE) injection 2 mL (Completed)    Other Relevant Orders    Large Joint Arthrocentesis: R knee (Completed)    Visco Treatment    Primary osteoarthritis of left knee    Relevant Medications    Hyaluronan (MONOVISC) injection 88 mg (Completed)    lidocaine (cardiac) (XYLOCAINE) injection 2 mL (Completed)    Other Relevant Orders    Large Joint Arthrocentesis: L knee (Completed)    Visco Treatment          Follow Up   • Bilateral knee Monovisc injection was discussed with the patient. Discussed indication, risks, benefits, and alternatives. Verbal consent was given  to proceed with the procedure.   • Injection was performed from anteromedial approach.  Patient tolerated the procedure well and no complications were encountered.  • Discussion of orthopedic goals and activities and patient/guardian expressed understanding.  • Ice, heat, rest, compression and elevation of extremity as beneficial  • nsaids and/or tylenol as beneficial  • Instructed to refrain from heavy activity/rest the extremity for the next 24-48 hours  • Discussion regarding possibility of cortisol flare and what to expect if this occurs  • Watch for signs and symptoms of infection  • Call if adverse effect from injection therapy  • Follow up in 3 months for steroid injections in 6 months for Visco  • Patient was given instructions and counseling regarding his condition or for health maintenance advice. Please see specific information pulled into the AVS if appropriate.     Shayan Donnelly PA-C   Date of Encounter: 11/8/2021   Electronically signed by Shayan Donnelly PA-C, 11/08/21, 10:22 AM EST.      LARON Dragon/Transcription disclaimer:  Much of this encounter note is an electronic transcription/translation of spoken language to printed text. The electronic translation of spoken language may permit erroneous, or at times, nonsensical words or phrases to be inadvertently transcribed; Although I have reviewed the note for such errors, some may still exist.

## 2021-11-17 ENCOUNTER — TELEPHONE (OUTPATIENT)
Dept: ORTHOPEDIC SURGERY | Facility: CLINIC | Age: 71
End: 2021-11-17

## 2021-11-17 NOTE — TELEPHONE ENCOUNTER
Caller: MOHSEN     Relationship: [facility]    Best call back number: [phone]  What form or medical record are you requestin21 OFFICE NOTE    Who is requesting this form or medical record from you:  [facility]     How would you like to receive the form or medical records (pick-up, mail, fax): FAX  If fax, what is the fax number: [phone]  If mail, what is the address:   If pick-up, provide patient with address and location details    Timeframe paperwork needed: ASAP    Additional notes:  MOHSEN FROM [facility] ADVISED HE NEEDS 21 OFFICE NOTE SENT VIA FAX ASAP.

## 2021-12-01 ENCOUNTER — TELEPHONE (OUTPATIENT)
Dept: ORTHOPEDIC SURGERY | Facility: CLINIC | Age: 71
End: 2021-12-01

## 2021-12-01 NOTE — TELEPHONE ENCOUNTER
The next injection is scheduled for steroid only. Those codes for what I bill are the following      68113  And then a procedure code (82279 with modifier)

## 2021-12-01 NOTE — TELEPHONE ENCOUNTER
Provider: RALEIGH LIMON    Caller: [facility]    Relationship to Patient: [facility]       Phone Number:  [phone]    Reason for Call: PT. HAS APPT. IN February WITH [facility] FOR CORTISONE INJECTIONS.   [facility] IS ASKING FOR THE BILLING CODE FOR THIS APPT. TO SEE IF THIS WILL BE COVERED.   ASKING IF SOMEONE FROM OFFICE WILL CALL TO ADVISE.

## 2022-02-08 ENCOUNTER — CLINICAL SUPPORT (OUTPATIENT)
Dept: ORTHOPEDIC SURGERY | Facility: CLINIC | Age: 72
End: 2022-02-08

## 2022-02-08 VITALS — HEIGHT: 72 IN | BODY MASS INDEX: 40.63 KG/M2 | TEMPERATURE: 98.2 F | WEIGHT: 300 LBS

## 2022-02-08 DIAGNOSIS — M17.12 PRIMARY OSTEOARTHRITIS OF LEFT KNEE: ICD-10-CM

## 2022-02-08 DIAGNOSIS — M17.11 PRIMARY OSTEOARTHRITIS OF RIGHT KNEE: Primary | ICD-10-CM

## 2022-02-08 PROCEDURE — 20610 DRAIN/INJ JOINT/BURSA W/O US: CPT | Performed by: PHYSICIAN ASSISTANT

## 2022-02-08 RX ORDER — METHYLPREDNISOLONE ACETATE 80 MG/ML
160 INJECTION, SUSPENSION INTRA-ARTICULAR; INTRALESIONAL; INTRAMUSCULAR; SOFT TISSUE
Status: COMPLETED | OUTPATIENT
Start: 2022-02-08 | End: 2022-02-08

## 2022-02-08 RX ADMIN — METHYLPREDNISOLONE ACETATE 160 MG: 80 INJECTION, SUSPENSION INTRA-ARTICULAR; INTRALESIONAL; INTRAMUSCULAR; SOFT TISSUE at 09:53

## 2022-02-08 RX ADMIN — METHYLPREDNISOLONE ACETATE 160 MG: 80 INJECTION, SUSPENSION INTRA-ARTICULAR; INTRALESIONAL; INTRAMUSCULAR; SOFT TISSUE at 09:54

## 2022-02-08 NOTE — PROGRESS NOTES
"Chief Complaint  Follow-up and Pain of the Left Knee and Follow-up and Pain of the Right Knee    Subjective    History of Present Illness      Kyler Kauffman is a 71 y.o. male who presents to [facility] for is here today for injection therapy. He receives  BILATERAL knee injections of Monovisc. Since last injection, patient notes substantial relief of symptoms but is definitely in need of the injections today. Treatment options have been discussed and he understands and consents.       Objective   Vital Signs:   Temp 98.2 °F (36.8 °C)   Ht 182.9 cm (72.01\")   Wt 136 kg (300 lb)   BMI 40.68 kg/m²     Physical Exam  71 y.o. male is awake, alert, oriented, in no acute distress and well developed, well nourished.  Ortho Exam   BILATERAL knee  There is moderate joint line tenderness at the medial aspect of both knees.   Positive for crepitus throughout range of motion.   Positive for mild effusion.  Findings are consistent with synovitis and effusion.    Positive patellar grind test.   Negative Lachman test.    Negative anterior and posterior drawer.  Range of motion in extension and flexion is: 5-100 (right), 5-105 (left) degrees.  Neurovascular status is intact.  Dorsalis pedis and posterior tibial artery pulses are palpable.   Common peroneal nerve function is well preserved.   Gait is cautious and antalgic.  Positive for Baker's Cyst with fullness and tenderness in the popliteal fossa.            PROCEDURE  Large Joint Arthrocentesis: R knee  Date/Time: 2/8/2022 9:53 AM  Consent given by: patient  Site marked: site marked  Timeout: Immediately prior to procedure a time out was called to verify the correct patient, procedure, equipment, support staff and site/side marked as required   Supporting Documentation  Indications: pain   Procedure Details  Location: knee - R knee  Preparation: Patient was prepped and draped in the usual sterile fashion  Needle size: 25 G  Approach: " anteromedial  Medications administered: 2 mL lidocaine (cardiac); 160 mg methylPREDNISolone acetate 80 MG/ML  Patient tolerance: patient tolerated the procedure well with no immediate complications    Large Joint Arthrocentesis: L knee  Date/Time: 2/8/2022 9:54 AM  Consent given by: patient  Site marked: site marked  Timeout: Immediately prior to procedure a time out was called to verify the correct patient, procedure, equipment, support staff and site/side marked as required   Supporting Documentation  Indications: pain   Procedure Details  Location: knee - L knee  Preparation: Patient was prepped and draped in the usual sterile fashion  Needle size: 25 G  Approach: anteromedial  Medications administered: 2 mL lidocaine (cardiac); 160 mg methylPREDNISolone acetate 80 MG/ML  Patient tolerance: patient tolerated the procedure well with no immediate complications        Injection site was identified by physical examination and cleaned with Betadine and alcohol swabs. Prior to needle insertion, ethyl chloride spray was used for surface anesthesia. Sterile technique was used.       Assessment   Assessment and Plan    Problem List Items Addressed This Visit        Musculoskeletal and Injuries    Primary osteoarthritis of right knee - Primary    Relevant Orders    Large Joint Arthrocentesis: R knee    Primary osteoarthritis of left knee    Relevant Orders    Large Joint Arthrocentesis: L knee          Follow Up   • Bilateral knee steroid (depomedrol) injection was discussed with the patient. Discussed indication, risks, benefits, and alternatives. Verbal consent was given to proceed with the procedure.   • Injection was performed from anteromedial approach.  Patient tolerated the procedure well and no complications were encountered.  • Discussion of orthopedic goals and activities and patient/guardian expressed understanding.  • Ice, heat, rest, compression and elevation of extremity as beneficial  • nsaids and/or tylenol  as beneficial  • Instructed to refrain from heavy activity/rest the extremity for the next 24-48 hours  • Discussion regarding possibility of cortisol flare and what to expect if this occurs  • Watch for signs and symptoms of infection  • Call if adverse effect from injection therapy  • Follow up in 3 months for visco injections in 6 months for steroid  • Patient was given instructions and counseling regarding his condition or for health maintenance advice. Please see specific information pulled into the AVS if appropriate.     Shayan Donnelly PA-C   Date of Encounter: 2/8/2022   Electronically signed by Shayan Donnelly PA-C, 02/08/22, 10:02 AM EST.       EMR Dragon/Transcription disclaimer:  Much of this encounter note is an electronic transcription/translation of spoken language to printed text. The electronic translation of spoken language may permit erroneous, or at times, nonsensical words or phrases to be inadvertently transcribed; Although I have reviewed the note for such errors, some may still exist.

## 2022-03-01 ENCOUNTER — OFFICE VISIT (OUTPATIENT)
Dept: CARDIOLOGY | Facility: CLINIC | Age: 72
End: 2022-03-01

## 2022-03-01 VITALS
DIASTOLIC BLOOD PRESSURE: 80 MMHG | HEART RATE: 103 BPM | BODY MASS INDEX: 42.66 KG/M2 | SYSTOLIC BLOOD PRESSURE: 145 MMHG | WEIGHT: 315 LBS | HEIGHT: 72 IN

## 2022-03-01 DIAGNOSIS — E66.01 MORBID (SEVERE) OBESITY DUE TO EXCESS CALORIES: ICD-10-CM

## 2022-03-01 DIAGNOSIS — R06.09 DYSPNEA ON EXERTION: Primary | ICD-10-CM

## 2022-03-01 DIAGNOSIS — I10 HYPERTENSION, ESSENTIAL: ICD-10-CM

## 2022-03-01 DIAGNOSIS — I20.9 ANGINA PECTORIS: ICD-10-CM

## 2022-03-01 PROCEDURE — 93000 ELECTROCARDIOGRAM COMPLETE: CPT | Performed by: INTERNAL MEDICINE

## 2022-03-01 PROCEDURE — 99204 OFFICE O/P NEW MOD 45 MIN: CPT | Performed by: INTERNAL MEDICINE

## 2022-03-01 RX ORDER — ASPIRIN 81 MG/1
81 TABLET ORAL DAILY
Qty: 90 TABLET | Refills: 11 | Status: SHIPPED | OUTPATIENT
Start: 2022-03-01

## 2022-03-01 NOTE — PROGRESS NOTES
Chief Complaint  Shortness of Breath (new pt referral)    Subjective            Kyler Kauffman presents to [facility]  History of Present Illness    71-year-old white male.  He has diabetes, hypertension and is morbidly obese.  He has been having increasingly progressive shortness of breath, moderately intense, worse with exertion, also occurring at night possibly some degree of orthopnea.  He does have back problems, and sleeps in a recliner.  He has sleep apnea and uses CPAP.  He denies fluid accumulation on his current diuretic regimen.  He has not had a recent cardiac work-up.  He is a non-smoker.    PMH  Past Medical History:   Diagnosis Date   • Back pain    • Diabetes (HCC)    • Hypertension    • Neck pain    • Neuropathy          SURGICALHX  Past Surgical History:   Procedure Laterality Date   • APPENDECTOMY  1968   • HAND SURGERY  2005        SOC  Social History     Socioeconomic History   • Marital status:    Tobacco Use   • Smoking status: Never Smoker   • Smokeless tobacco: Never Used   Vaping Use   • Vaping Use: Never used   Substance and Sexual Activity   • Alcohol use: Never   • Drug use: Defer   • Sexual activity: Defer         FAMHX  Family History   Problem Relation Age of Onset   • No Known Problems Mother    • No Known Problems Father           ALLERGY  Allergies   Allergen Reactions   • Bee Venom Swelling        MEDSCURRENT    Current Outpatient Medications:   •  albuterol (PROVENTIL) (2.5 MG/3ML) 0.083% nebulizer solution, 3 mL Every 8 (Eight) Hours., Disp: , Rfl:   •  Candesartan Cilexetil-HCTZ 32-25 MG tablet, Take 1 tablet by mouth Daily., Disp: , Rfl:   •  carvedilol (COREG) 3.125 MG tablet, Take 3.125 mg by mouth 2 (Two) Times a Day With Meals., Disp: , Rfl:   •  citalopram (CeleXA) 40 MG tablet, , Disp: , Rfl:   •  folic acid-vit B6-vit B12 (FOLTABS) 0.8-10-0.115 MG tablet tablet, Take  by mouth Daily., Disp: , Rfl:   •  furosemide (LASIX) 40 MG  "tablet, Lasix 40 mg tablet  Take 1 tablet every day by oral route., Disp: , Rfl:   •  gabapentin (NEURONTIN) 600 MG tablet, TAKE 1 & 1 2 (ONE & ONE HALF) TABLETS BY MOUTH THREE TIMES DAILY, Disp: , Rfl:   •  glipizide (GLUCOTROL) 10 MG tablet, glipizide ER 10 mg 24 hr tablet,extended release  Take 1 tablet every day by oral route., Disp: , Rfl:   •  JANUVIA 100 MG tablet, Take 100 mg by mouth Daily., Disp: , Rfl:   •  meloxicam (MOBIC) 7.5 MG tablet, TAKE 1 TABLET BY MOUTH ONCE DAILY AS NEEDED FOR ARTHRITIS PAIN, Disp: , Rfl:   •  metFORMIN (GLUCOPHAGE) 1000 MG tablet, Take 1,000 mg by mouth 2 (Two) Times a Day With Meals., Disp: , Rfl:   •  naproxen (NAPROSYN) 375 MG tablet, Take 375 mg by mouth 2 (Two) Times a Day As Needed for Mild Pain ., Disp: , Rfl:   •  omeprazole (priLOSEC) 40 MG capsule, omeprazole 40 mg capsule,delayed release  Take 1 capsule every day by oral route as needed., Disp: , Rfl:   •  potassium chloride (K-DUR,KLOR-CON) 20 MEQ CR tablet, Take 20 mEq by mouth Daily., Disp: , Rfl:   •  tiZANidine (ZANAFLEX) 4 MG tablet, , Disp: , Rfl:   •  aspirin (aspirin) 81 MG EC tablet, Take 1 tablet by mouth Daily., Disp: 90 tablet, Rfl: 11      Review of Systems   Constitutional: Positive for weight gain.   HENT: Negative.    Eyes: Negative.    Cardiovascular: Positive for dyspnea on exertion and orthopnea. Negative for chest pain.   Respiratory: Positive for shortness of breath.    Endocrine: Negative.    Hematologic/Lymphatic: Negative.    Skin: Negative.    Musculoskeletal: Positive for back pain and joint pain.   Gastrointestinal: Negative.    Genitourinary: Negative.    Neurological: Negative.    Psychiatric/Behavioral: Negative.         Objective     /80   Pulse 103   Ht 182.9 cm (72\")   Wt (!) 143 kg (315 lb)   BMI 42.72 kg/m²       General Appearance:   · well developed  · well nourished, morbidly obese  HENT:   · oropharynx moist  · lips not cyanotic  Neck:  · thyroid not " enlarged  · supple  Respiratory:  · no respiratory distress  · normal breath sounds  · no rales  Cardiovascular:  · no jugular venous distention  · regular rhythm  · apical impulse normal  · S1 normal, S2 normal  · no S3, no S4   · no murmur  · no rub, no thrill  · carotid pulses normal; no bruit  · pedal pulses normal  · lower extremity edema: Trace with varicosities  Musculoskeletal:  · no clubbing of fingers.   · normocephalic, head atraumatic  Skin:   · warm, dry  Psychiatric:  · judgement and insight appropriate  · normal mood and affect      Result Review :             Data reviewed: Primary care records reviewed, laboratory studies reviewed, LDL 62, CBC normal, CMP normal       ECG 12 Lead    Date/Time: 3/1/2022 9:28 AM  Performed by: EVITA Ortega MD  Authorized by: EVITA Ortega MD   Previous ECG: no previous ECG available  Rhythm: sinus rhythm  Ectopy: atrial premature contractions  Conduction: conduction normal  Q waves: II, III and aVF    ST Segments: ST segments normal  T Waves: T waves normal  QRS axis: left  Other: no other findings    Clinical impression: non-specific ECG                 Assessment and Plan        ASSESSMENT:  Encounter Diagnoses   Name Primary?   • Angina pectoris (HCC)    • Dyspnea on exertion Yes   • Morbid (severe) obesity due to excess calories (HCC)    • Hypertension, essential          PLAN:    1.  Kyler has dyspnea on exertion, somewhat progressive with orthopnea.  He is morbidly obese.  He is at risk for cardiovascular disease with known diabetes and hypertension.  An echo and stress imaging study will be scheduled for further evaluation.  At this time he does not appear to be volume overloaded.  2.  Hypertension, slightly elevated today, continue current medical regimen.  3.  Morbidly obese, weight loss long-term is going to be key to improve his symptomatology.    We will schedule a follow-up in a few weeks after his diagnostics, if there does not appear to be  underlying significant heart disease on noninvasive assessment, a specific intentional plan for weight loss would be the next recommendation          Patient was given instructions and counseling regarding his condition or for health maintenance advice. Please see specific information pulled into the AVS if appropriate.             EVITA Ortega MD  3/1/2022    09:26 EST

## 2022-03-01 NOTE — PATIENT INSTRUCTIONS
Calorie Counting for Weight Loss  Calories are units of energy. Your body needs a certain number of calories from food to keep going throughout the day. When you eat or drink more calories than your body needs, your body stores the extra calories mostly as fat. When you eat or drink fewer calories than your body needs, your body burns fat to get the energy it needs.  Calorie counting means keeping track of how many calories you eat and drink each day. Calorie counting can be helpful if you need to lose weight. If you eat fewer calories than your body needs, you should lose weight. Ask your health care provider what a healthy weight is for you.  For calorie counting to work, you will need to eat the right number of calories each day to lose a healthy amount of weight per week. A dietitian can help you figure out how many calories you need in a day and will suggest ways to reach your calorie goal.  · A healthy amount of weight to lose each week is usually 1-2 lb (0.5-0.9 kg). This usually means that your daily calorie intake should be reduced by 500-750 calories.  · Eating 1,200-1,500 calories a day can help most women lose weight.  · Eating 1,500-1,800 calories a day can help most men lose weight.  What do I need to know about calorie counting?  Work with your health care provider or dietitian to determine how many calories you should get each day. To meet your daily calorie goal, you will need to:  · Find out how many calories are in each food that you would like to eat. Try to do this before you eat.  · Decide how much of the food you plan to eat.  · Keep a food log. Do this by writing down what you ate and how many calories it had.  To successfully lose weight, it is important to balance calorie counting with a healthy lifestyle that includes regular activity.  Where do I find calorie information?    The number of calories in a food can be found on a Nutrition Facts label. If a food does not have a Nutrition Facts  label, try to look up the calories online or ask your dietitian for help.  Remember that calories are listed per serving. If you choose to have more than one serving of a food, you will have to multiply the calories per serving by the number of servings you plan to eat. For example, the label on a package of bread might say that a serving size is 1 slice and that there are 90 calories in a serving. If you eat 1 slice, you will have eaten 90 calories. If you eat 2 slices, you will have eaten 180 calories.  How do I keep a food log?  After each time that you eat, record the following in your food log as soon as possible:  · What you ate. Be sure to include toppings, sauces, and other extras on the food.  · How much you ate. This can be measured in cups, ounces, or number of items.  · How many calories were in each food and drink.  · The total number of calories in the food you ate.  Keep your food log near you, such as in a pocket-sized notebook or on an alyce or website on your mobile phone. Some programs will calculate calories for you and show you how many calories you have left to meet your daily goal.  What are some portion-control tips?  · Know how many calories are in a serving. This will help you know how many servings you can have of a certain food.  · Use a measuring cup to measure serving sizes. You could also try weighing out portions on a kitchen scale. With time, you will be able to estimate serving sizes for some foods.  · Take time to put servings of different foods on your favorite plates or in your favorite bowls and cups so you know what a serving looks like.  · Try not to eat straight from a food's packaging, such as from a bag or box. Eating straight from the package makes it hard to see how much you are eating and can lead to overeating. Put the amount you would like to eat in a cup or on a plate to make sure you are eating the right portion.  · Use smaller plates, glasses, and bowls for smaller  portions and to prevent overeating.  · Try not to multitask. For example, avoid watching TV or using your computer while eating. If it is time to eat, sit down at a table and enjoy your food. This will help you recognize when you are full. It will also help you be more mindful of what and how much you are eating.  What are tips for following this plan?  Reading food labels  · Check the calorie count compared with the serving size. The serving size may be smaller than what you are used to eating.  · Check the source of the calories. Try to choose foods that are high in protein, fiber, and vitamins, and low in saturated fat, trans fat, and sodium.  Shopping  · Read nutrition labels while you shop. This will help you make healthy decisions about which foods to buy.  · Pay attention to nutrition labels for low-fat or fat-free foods. These foods sometimes have the same number of calories or more calories than the full-fat versions. They also often have added sugar, starch, or salt to make up for flavor that was removed with the fat.  · Make a grocery list of lower-calorie foods and stick to it.  Cooking  · Try to cook your favorite foods in a healthier way. For example, try baking instead of frying.  · Use low-fat dairy products.  Meal planning  · Use more fruits and vegetables. One-half of your plate should be fruits and vegetables.  · Include lean proteins, such as chicken, turkey, and fish.  Lifestyle  Each week, aim to do one of the following:  · 150 minutes of moderate exercise, such as walking.  · 75 minutes of vigorous exercise, such as running.  General information  · Know how many calories are in the foods you eat most often. This will help you calculate calorie counts faster.  · Find a way of tracking calories that works for you. Get creative. Try different apps or programs if writing down calories does not work for you.  What foods should I eat?    · Eat nutritious foods. It is better to have a nutritious,  high-calorie food, such as an avocado, than a food with few nutrients, such as a bag of potato chips.  · Use your calories on foods and drinks that will fill you up and will not leave you hungry soon after eating.  ? Examples of foods that fill you up are nuts and nut butters, vegetables, lean proteins, and high-fiber foods such as whole grains. High-fiber foods are foods with more than 5 g of fiber per serving.  · Pay attention to calories in drinks. Low-calorie drinks include water and unsweetened drinks.  The items listed above may not be a complete list of foods and beverages you can eat. Contact a dietitian for more information.  What foods should I limit?  Limit foods or drinks that are not good sources of vitamins, minerals, or protein or that are high in unhealthy fats. These include:  · Candy.  · Other sweets.  · Sodas, specialty coffee drinks, alcohol, and juice.  The items listed above may not be a complete list of foods and beverages you should avoid. Contact a dietitian for more information.  How do I count calories when eating out?  · Pay attention to portions. Often, portions are much larger when eating out. Try these tips to keep portions smaller:  ? Consider sharing a meal instead of getting your own.  ? If you get your own meal, eat only half of it. Before you start eating, ask for a container and put half of your meal into it.  ? When available, consider ordering smaller portions from the menu instead of full portions.  · Pay attention to your food and drink choices. Knowing the way food is cooked and what is included with the meal can help you eat fewer calories.  ? If calories are listed on the menu, choose the lower-calorie options.  ? Choose dishes that include vegetables, fruits, whole grains, low-fat dairy products, and lean proteins.  ? Choose items that are boiled, broiled, grilled, or steamed. Avoid items that are buttered, battered, fried, or served with cream sauce. Items labeled as  crispy are usually fried, unless stated otherwise.  ? Choose water, low-fat milk, unsweetened iced tea, or other drinks without added sugar. If you want an alcoholic beverage, choose a lower-calorie option, such as a glass of wine or light beer.  ? Ask for dressings, sauces, and syrups on the side. These are usually high in calories, so you should limit the amount you eat.  ? If you want a salad, choose a garden salad and ask for grilled meats. Avoid extra toppings such as archer, cheese, or fried items. Ask for the dressing on the side, or ask for olive oil and vinegar or lemon to use as dressing.  · Estimate how many servings of a food you are given. Knowing serving sizes will help you be aware of how much food you are eating at restaurants.  Where to find more information  · Centers for Disease Control and Prevention: www.cdc.gov  · [place]. Department of Agriculture: myplate.gov  Summary  · Calorie counting means keeping track of how many calories you eat and drink each day. If you eat fewer calories than your body needs, you should lose weight.  · A healthy amount of weight to lose per week is usually 1-2 lb (0.5-0.9 kg). This usually means reducing your daily calorie intake by 500-750 calories.  · The number of calories in a food can be found on a Nutrition Facts label. If a food does not have a Nutrition Facts label, try to look up the calories online or ask your dietitian for help.  · Use smaller plates, glasses, and bowls for smaller portions and to prevent overeating.  · Use your calories on foods and drinks that will fill you up and not leave you hungry shortly after a meal.  This information is not intended to replace advice given to you by your health care provider. Make sure you discuss any questions you have with your health care provider.  Document Revised: 01/28/2021 Document Reviewed: 01/28/2021  Elsevier Patient Education © 2021 [employer]

## 2022-03-15 ENCOUNTER — TELEPHONE (OUTPATIENT)
Dept: CARDIOLOGY | Facility: CLINIC | Age: 72
End: 2022-03-15

## 2022-03-15 NOTE — TELEPHONE ENCOUNTER
Received VM from patient wanting to know when his stress test was    SW patient. Advised 3/29 at 11:30

## 2022-03-31 ENCOUNTER — HOSPITAL ENCOUNTER (OUTPATIENT)
Dept: CARDIOLOGY | Facility: HOSPITAL | Age: 72
Discharge: HOME | End: 2022-03-31

## 2022-03-31 DIAGNOSIS — R06.09 DYSPNEA ON EXERTION: ICD-10-CM

## 2022-03-31 DIAGNOSIS — I20.9 ANGINA PECTORIS: ICD-10-CM

## 2022-04-01 ENCOUNTER — TELEPHONE (OUTPATIENT)
Dept: CARDIOLOGY | Facility: CLINIC | Age: 72
End: 2022-04-01

## 2022-04-01 NOTE — TELEPHONE ENCOUNTER
----- Message from EVITA Ortega MD sent at 3/31/2022  4:59 PM EDT -----  Echo reviewed  Heart function was normal, somewhat difficult pictures due to obesity  Valve function was overall good    Follow-up in April as scheduled

## 2022-04-01 NOTE — TELEPHONE ENCOUNTER
Per Dr Ortega:  SPECT showed normal cardiac blood flow, no evidence of blocked arteries     Plan is follow-up as scheduled       VM left with return call requested

## 2022-04-11 ENCOUNTER — OFFICE VISIT (OUTPATIENT)
Dept: CARDIOLOGY | Facility: CLINIC | Age: 72
End: 2022-04-11

## 2022-04-11 VITALS
HEART RATE: 80 BPM | HEIGHT: 72 IN | WEIGHT: 310 LBS | BODY MASS INDEX: 41.99 KG/M2 | SYSTOLIC BLOOD PRESSURE: 149 MMHG | DIASTOLIC BLOOD PRESSURE: 87 MMHG

## 2022-04-11 DIAGNOSIS — E78.5 DYSLIPIDEMIA: ICD-10-CM

## 2022-04-11 DIAGNOSIS — E66.01 MORBID (SEVERE) OBESITY DUE TO EXCESS CALORIES: ICD-10-CM

## 2022-04-11 DIAGNOSIS — I10 HYPERTENSION, ESSENTIAL: ICD-10-CM

## 2022-04-11 DIAGNOSIS — R06.09 DYSPNEA ON EXERTION: Primary | ICD-10-CM

## 2022-04-11 PROCEDURE — 99214 OFFICE O/P EST MOD 30 MIN: CPT | Performed by: NURSE PRACTITIONER

## 2022-04-11 NOTE — PROGRESS NOTES
Chief Complaint  Shortness of Breath (Follow up from echo & stress test)    Subjective            Kyler Kauffman presents to [facility]  History of Present Illness    Kyler is a 71-year-old white male here today for follow-up evaluation management of hypertension, dyspnea, morbid obesity, sleep apnea with CPAP compliance.  He has been having progressive shortness of breath, moderately intense, worse with exertion.  Testing was completed, stress imaging showed no evidence of myocardial ischemia, EF 63%.  Echocardiogram was somewhat technically difficult due to obesity however showed EF 55%, mild LVH and grade 1 diastolic dysfunction, no significant valvular dysfunction.  Since his last appointment, Kyler reports he is feeling well.  He reports improvement in his shortness of breath.  He denies any significant chest pain, palpitations, or dizziness.  He is compliant with all medications.    PMH  Past Medical History:   Diagnosis Date   • Back pain    • Diabetes (HCC)    • Hypertension    • Neck pain    • Neuropathy          SURGICALHX  Past Surgical History:   Procedure Laterality Date   • APPENDECTOMY  1968   • HAND SURGERY  2005        SOC  Social History     Socioeconomic History   • Marital status:    Tobacco Use   • Smoking status: Never Smoker   • Smokeless tobacco: Never Used   Vaping Use   • Vaping Use: Never used   Substance and Sexual Activity   • Alcohol use: Never   • Drug use: Defer   • Sexual activity: Defer         FAMHX  Family History   Problem Relation Age of Onset   • No Known Problems Mother    • No Known Problems Father           ALLERGY  Allergies   Allergen Reactions   • Bee Venom Swelling        MEDSCURRENT    Current Outpatient Medications:   •  albuterol (PROVENTIL) (2.5 MG/3ML) 0.083% nebulizer solution, 3 mL Every 8 (Eight) Hours., Disp: , Rfl:   •  aspirin (aspirin) 81 MG EC tablet, Take 1 tablet by mouth Daily., Disp: 90 tablet, Rfl: 11  •   "Candesartan Cilexetil-HCTZ 32-25 MG tablet, Take 1 tablet by mouth Daily., Disp: , Rfl:   •  carvedilol (COREG) 3.125 MG tablet, Take 3.125 mg by mouth 2 (Two) Times a Day With Meals., Disp: , Rfl:   •  citalopram (CeleXA) 40 MG tablet, , Disp: , Rfl:   •  folic acid-vit B6-vit B12 (FOLTABS) 0.8-10-0.115 MG tablet tablet, Take  by mouth Daily., Disp: , Rfl:   •  furosemide (LASIX) 40 MG tablet, Lasix 40 mg tablet  Take 1 tablet every day by oral route., Disp: , Rfl:   •  gabapentin (NEURONTIN) 600 MG tablet, TAKE 1 & 1 2 (ONE & ONE HALF) TABLETS BY MOUTH THREE TIMES DAILY, Disp: , Rfl:   •  glipizide (GLUCOTROL) 10 MG tablet, glipizide ER 10 mg 24 hr tablet,extended release  Take 1 tablet every day by oral route., Disp: , Rfl:   •  JANUVIA 100 MG tablet, Take 100 mg by mouth Daily., Disp: , Rfl:   •  meloxicam (MOBIC) 7.5 MG tablet, TAKE 1 TABLET BY MOUTH ONCE DAILY AS NEEDED FOR ARTHRITIS PAIN, Disp: , Rfl:   •  metFORMIN (GLUCOPHAGE) 1000 MG tablet, Take 1,000 mg by mouth 2 (Two) Times a Day With Meals., Disp: , Rfl:   •  naproxen (NAPROSYN) 375 MG tablet, Take 375 mg by mouth 2 (Two) Times a Day As Needed for Mild Pain ., Disp: , Rfl:   •  omeprazole (priLOSEC) 40 MG capsule, omeprazole 40 mg capsule,delayed release  Take 1 capsule every day by oral route as needed., Disp: , Rfl:   •  potassium chloride (K-DUR,KLOR-CON) 20 MEQ CR tablet, Take 20 mEq by mouth Daily., Disp: , Rfl:   •  tiZANidine (ZANAFLEX) 4 MG tablet, , Disp: , Rfl:       Review of Systems   Constitutional: Negative for malaise/fatigue.   Cardiovascular: Positive for dyspnea on exertion and orthopnea. Negative for chest pain, irregular heartbeat, leg swelling, near-syncope, palpitations and syncope.   Respiratory: Positive for shortness of breath.    Neurological: Negative for dizziness, headaches and light-headedness.        Objective     /87   Pulse 80   Ht 182.9 cm (72\")   Wt (!) 141 kg (310 lb)   BMI 42.04 kg/m²       General " Appearance:   · well developed  · well nourished, obese  HENT:   · oropharynx moist  · lips not cyanotic  Neck:  · thyroid not enlarged  · supple  Respiratory:  · no respiratory distress  · normal breath sounds  · no rales  Cardiovascular:  · no jugular venous distention  · regular rhythm  · apical impulse normal  · S1 normal, S2 normal  · no S3, no S4   · no murmur  · no rub, no thrill  · carotid pulses normal; no bruit  · pedal pulses normal  · lower extremity edema: Trace  Musculoskeletal:  · no clubbing of fingers.   · normocephalic, head atraumatic  Skin:   · warm, dry  Psychiatric:  · judgement and insight appropriate  · normal mood and affect      Result Review :             Data reviewed: Cardiology studies Recent testing including stress imaging and echocardiogram reviewed as above.  Recent laboratory studies reviewed.  Lipid panel reviewed, .     Procedures      Kyler Kauffman  reports that he has never smoked. He has never used smokeless tobacco.. I have educated him on the risk of diseases from using tobacco products such as cancer, COPD and heart disease.       Patient's Body mass index is 42.04 kg/m². indicating that he is morbidly obese (BMI > 40 or > 35 with obesity - related health condition). Obesity-related health conditions include the following: obstructive sleep apnea, hypertension, diabetes mellitus and dyslipidemias. Obesity is unchanged. BMI is Above average. We discussed portion control and increasing exercise..         Assessment and Plan        ASSESSMENT:  Encounter Diagnoses   Name Primary?   • Dyspnea on exertion Yes   • Hypertension, essential    • Morbid (severe) obesity due to excess calories (HCC)    • Dyslipidemia          PLAN:    1.  Dyspnea on exertion-echocardiogram with overall normal findings, mild diastolic dysfunction, low normal EF.  No significant valvular dysfunction.  Stress also normal as reviewed above.  Weight loss goals reviewed as above, this will be  helpful to improving his symptomology.  2.  Hypertension-controlled.  Home blood pressure log reviewed, systolic 140 over diastolic 75-80.  Continue current medical therapy.  3.  Morbid obesity-Unseld as above.  4.  Dyslipidemia-LDL cholesterol 161.  With Kyler's risk factors he is high risk for cardiovascular disease, he has a 59% risk of a cardiovascular event in the next 10 years according to pooled cohort, for this reason he does need to be on high intensity statin.  I do not have a statin listed on his med rec however he believes he is already taking this, he will review his medication bottles at home and call the office to report medication names.  If he is not on a statin we will initiate this and repeat lipid panel in few months.  Continue daily aspirin.    Kyler is agreeable to the plan of care and will follow up annually unless problems arise.        Patient was given instructions and counseling regarding his condition or for health maintenance advice. Please see specific information pulled into the AVS if appropriate.           Andree Rich, DUSTIN   4/11/2022  09:14 EDT

## 2022-05-13 ENCOUNTER — TELEPHONE (OUTPATIENT)
Dept: ORTHOPEDIC SURGERY | Facility: CLINIC | Age: 72
End: 2022-05-13

## 2022-05-13 NOTE — TELEPHONE ENCOUNTER
Caller: LEENA ISRAEL     Relationship: SELF    Best call back number: [phone]    Who are you requesting to speak with (clinical staff, provider,  specific staff member):      Do you know the name of the person who called: CASTRO    What was the call regarding: SCHEDULING     Do you require a callback: YES

## 2022-06-03 ENCOUNTER — TELEPHONE (OUTPATIENT)
Dept: ORTHOPEDIC SURGERY | Facility: CLINIC | Age: 72
End: 2022-06-03

## 2022-06-03 NOTE — TELEPHONE ENCOUNTER
Provider: RALEIGH LIMON     Caller: LEENA ISRAEL     Relationship to Patient: SELF     Phone Number: [phone]    Reason for Call: PATIENT ASKING IF [facility] AUTH WAS RECEIVED FOR HIS APPOINTMENT ON 07/01/22. HE STATES SOMEONE WAS SUPPOSED TO CALL HIM BUT HE HASN'T HEARD ANYTHING. HE WOULD LIKE A CALL BACK PLEASE.

## 2022-07-01 ENCOUNTER — CLINICAL SUPPORT (OUTPATIENT)
Dept: ORTHOPEDIC SURGERY | Facility: CLINIC | Age: 72
End: 2022-07-01

## 2022-07-01 VITALS — BODY MASS INDEX: 41.99 KG/M2 | TEMPERATURE: 98.5 F | WEIGHT: 310 LBS | HEIGHT: 72 IN

## 2022-07-01 DIAGNOSIS — M17.12 PRIMARY OSTEOARTHRITIS OF LEFT KNEE: ICD-10-CM

## 2022-07-01 DIAGNOSIS — M17.11 PRIMARY OSTEOARTHRITIS OF RIGHT KNEE: Primary | ICD-10-CM

## 2022-07-01 PROCEDURE — 20610 DRAIN/INJ JOINT/BURSA W/O US: CPT | Performed by: PHYSICIAN ASSISTANT

## 2022-07-01 RX ADMIN — LIDOCAINE HYDROCHLORIDE 2 ML: 10 INJECTION, SOLUTION EPIDURAL; INFILTRATION; INTRACAUDAL; PERINEURAL at 09:59

## 2022-07-01 NOTE — PROGRESS NOTES
"Chief Complaint  Follow-up and Pain of the Left Knee and Follow-up and Pain of the Right Knee    Subjective    History of Present Illness      Kyler Kauffman is a 71 y.o. male who presents to [facility] for injection therapy. He receives  BILATERAL knee injections of Monovisc. Since last injection, patient notes substantial relief of symptoms. Due to lapse in [facility] approval he is late on this injections. Treatment options have been discussed and he understands and consents.       Objective   Vital Signs:   Temp 98.5 °F (36.9 °C)   Ht 182.9 cm (72\")   Wt (!) 141 kg (310 lb)   BMI 42.04 kg/m²     Physical Exam  71 y.o. male is awake, alert, oriented, in no acute distress and well developed, well nourished.  Ortho Exam   BILATERAL knee  There is moderate joint line tenderness at the medial aspect of both knees.   Positive for crepitus throughout range of motion.   Positive for mild effusion.  Findings are consistent with synovitis and effusion.    Positive patellar grind test.   Negative Lachman test.    Negative anterior and posterior drawer.  Range of motion in extension and flexion is: 5-100 (right), 5-105 (left) degrees.  Neurovascular status is intact.  Dorsalis pedis and posterior tibial artery pulses are palpable.   Common peroneal nerve function is well preserved.   Gait is cautious and antalgic.  Positive for Baker's Cyst with fullness and tenderness in the popliteal fossa.        Result Review :         PROCEDURE  - Large Joint Arthrocentesis: bilateral knee on 7/1/2022 9:59 AM  Indications: pain  Details: 18 G needle, anteromedial approach  Medications (Right): 88 mg Hyaluronan 88 MG/4ML; 2 mL lidocaine PF 1% 1 %  Medications (Left): 2 mL lidocaine PF 1% 1 %; 88 mg Hyaluronan 88 MG/4ML  Outcome: tolerated well, no immediate complications  Procedure, treatment alternatives, risks and benefits explained, specific risks discussed. Consent was given by the patient. Immediately " prior to procedure a time out was called to verify the correct patient, procedure, equipment, support staff and site/side marked as required. Patient was prepped and draped in the usual sterile fashion.              Injection site was identified by physical examination and cleaned with Betadine and alcohol swabs. Prior to needle insertion, ethyl chloride spray was used for surface anesthesia. Sterile technique was used.       Assessment   Assessment and Plan    Problem List Items Addressed This Visit        Musculoskeletal and Injuries    Primary osteoarthritis of right knee - Primary    Relevant Orders    - Large Joint Arthrocentesis: bilateral knee    Visco Treatment    Primary osteoarthritis of left knee    Relevant Orders    - Large Joint Arthrocentesis: bilateral knee    Visco Treatment          Follow Up   · Bilateral knee steroid (depomedrol) injection was discussed with the patient. Discussed indication, risks, benefits, and alternatives. Verbal consent was given to proceed with the procedure.   · Injection was performed from anteromedial approach.  Patient tolerated the procedure well and no complications were encountered.  · Discussion of orthopedic goals and activities and patient/guardian expressed understanding.  · Ice, heat, rest, compression and elevation of extremity as beneficial  · nsaids and/or tylenol as beneficial  · Instructed to refrain from heavy activity/rest the extremity for the next 24-48 hours  · Discussion regarding possibility of cortisol flare and what to expect if this occurs  · Watch for signs and symptoms of infection  · Call if adverse effect from injection therapy  · Follow up in 6 months for visco injections in 3 months for steroid  · Patient was given instructions and counseling regarding his condition or for health maintenance advice. Please see specific information pulled into the AVS if appropriate.   •     Shayan Donnelly PA-C   Date of Encounter: 7/1/2022    Electronically signed by Shayan Donnelly PA-C, 07/04/22, 1:38 PM EDT.      EMR Dragon/Transcription disclaimer:  Much of this encounter note is an electronic transcription/translation of spoken language to printed text. The electronic translation of spoken language may permit erroneous, or at times, nonsensical words or phrases to be inadvertently transcribed; Although I have reviewed the note for such errors, some may still exist.

## 2022-07-04 RX ORDER — LIDOCAINE HYDROCHLORIDE 10 MG/ML
2 INJECTION, SOLUTION EPIDURAL; INFILTRATION; INTRACAUDAL; PERINEURAL
Status: COMPLETED | OUTPATIENT
Start: 2022-07-01 | End: 2022-07-01

## 2022-12-29 ENCOUNTER — TELEPHONE (OUTPATIENT)
Dept: ORTHOPEDIC SURGERY | Facility: CLINIC | Age: 72
End: 2022-12-29

## 2022-12-29 NOTE — TELEPHONE ENCOUNTER
[facility] Auth for 01 05 23 APPTMT 12/29/2022 PATIENT HAS CLINICAL QUESTIONS - NEEDS A CALL BACK - HUB ATTEMPTED TO WARM TRANSFER - HUB ADVISED WOULD NOTIFY CLINICAL TO CALL HIM @ [phone]

## 2023-01-03 NOTE — TELEPHONE ENCOUNTER
PT CALLED TODAY TO SPEAK TO SOMEONE ABOUT HIS APPT THAT'S SCHEDULED FOR TOMORROW - WE HAVE NO VA AUTH - NEEDS TO CANCEL OR RESCHEDULE - ATTEMPTED TO WT.

## 2023-01-04 ENCOUNTER — TELEPHONE (OUTPATIENT)
Dept: ORTHOPEDIC SURGERY | Facility: CLINIC | Age: 73
End: 2023-01-04

## 2023-01-04 NOTE — TELEPHONE ENCOUNTER
"    Caller: KUMAR    Relationship to patient: [facility]    Best call back number: [phone]    Patient is needing: CALLER REQ TO SPEAK WITH SOMEONE REGARDING \"CONTINUATION OF CARE FORM.\"  STATES IT HAS BEEN SENT IN TO PRACTICE BEFORE.  HUB GAVE FAX # TO PRACTICE IF NEEDED.          "

## 2023-02-01 ENCOUNTER — TELEPHONE (OUTPATIENT)
Dept: ORTHOPEDIC SURGERY | Facility: CLINIC | Age: 73
End: 2023-02-01

## 2023-02-01 NOTE — TELEPHONE ENCOUNTER
Caller: SHARON FRITZ    Relationship to patient: [facility]    Best call back number: [phone] [id number]- CALLER REQ IF NO ANSWER- PLEASE SCHEDULE FOR NEXT AVAIL APPT @ 11 - ALSO REQ AN EMAIL SENT TO HER WITH APPT DETAILS TO: [email or web address]    Chief complaint: KNEE PAIN    Type of visit: INJ    Requested date: NEXT AVAIL       Additional notes:CALLER STATES THIS SHOULD BE A CONTINUATION OF CARE

## 2023-02-08 ENCOUNTER — OFFICE VISIT (OUTPATIENT)
Dept: ORTHOPEDIC SURGERY | Facility: CLINIC | Age: 73
End: 2023-02-08
Payer: OTHER GOVERNMENT

## 2023-02-08 DIAGNOSIS — M17.11 PRIMARY OSTEOARTHRITIS OF RIGHT KNEE: Primary | ICD-10-CM

## 2023-02-08 DIAGNOSIS — M17.12 PRIMARY OSTEOARTHRITIS OF LEFT KNEE: ICD-10-CM

## 2023-02-08 PROCEDURE — 20610 DRAIN/INJ JOINT/BURSA W/O US: CPT | Performed by: PHYSICIAN ASSISTANT

## 2023-02-08 RX ADMIN — LIDOCAINE HYDROCHLORIDE 2 ML: 10 INJECTION, SOLUTION EPIDURAL; INFILTRATION; INTRACAUDAL; PERINEURAL at 15:37

## 2023-02-08 NOTE — PROGRESS NOTES
Chief Complaint  No chief complaint on file.    Subjective    History of Present Illness      Kyler Kauffman is a 72 y.o. male who presents to [facility] for injection therapy. He receives  BILATERAL knee injections of Monovisc. Since last injection, patient notes substantial relief of symptoms.  His last set of injections was in July 2022.  He was receiving alternating Monovisc and Depo-Medrol injections but has not done so since I was off on maternity leave. Treatment options have been discussed and he understands and consents.       Objective   Vital Signs:   There were no vitals taken for this visit.    Physical Exam  72 y.o. male is awake, alert, oriented, in no acute distress and well developed, well nourished.  Ortho Exam   BILATERAL knee  There is moderate joint line tenderness at the medial aspect of both knees.   Positive for crepitus throughout range of motion.   Positive for mild effusion.  Findings are consistent with synovitis and effusion.    Positive patellar grind test.   Negative Lachman test.    Negative anterior and posterior drawer.  Range of motion in extension and flexion is: 5-100 (right), 5-105 (left) degrees.  Neurovascular status is intact.  Dorsalis pedis and posterior tibial artery pulses are palpable.   Common peroneal nerve function is well preserved.   Gait is cautious and antalgic.  Positive for Baker's Cyst with fullness and tenderness in the popliteal fossa.          PROCEDURE  - Large Joint Arthrocentesis: bilateral knee on 2/8/2023 3:37 PM  Indications: pain  Details: 18 G needle, anteromedial approach  Medications (Right): 88 mg Hyaluronan 88 MG/4ML; 2 mL lidocaine PF 1% 1 %  Medications (Left): 2 mL lidocaine PF 1% 1 %; 88 mg Hyaluronan 88 MG/4ML  Outcome: tolerated well, no immediate complications  Procedure, treatment alternatives, risks and benefits explained, specific risks discussed. Consent was given by the patient. Immediately prior to  procedure a time out was called to verify the correct patient, procedure, equipment, support staff and site/side marked as required. Patient was prepped and draped in the usual sterile fashion.          Injection site was identified by physical examination and cleaned with Betadine and alcohol swabs. Prior to needle insertion, ethyl chloride spray was used for surface anesthesia. Sterile technique was used.       Assessment   Assessment and Plan    Problem List Items Addressed This Visit        Musculoskeletal and Injuries    Primary osteoarthritis of right knee - Primary    Relevant Orders    - Large Joint Arthrocentesis: bilateral knee    Visco Treatment    Primary osteoarthritis of left knee    Relevant Orders    - Large Joint Arthrocentesis: bilateral knee    Visco Treatment       Follow Up   · Bilateral knee Monovisc injections discussed with the patient. Discussed indication, risks, benefits, and alternatives. Verbal consent was given to proceed with the procedure.   · Injection was performed from anteromedial approach.  Patient tolerated the procedure well and no complications were encountered.  · Discussion of orthopedic goals and activities and patient/guardian expressed understanding.  · Ice, heat, rest, compression and elevation of extremity as beneficial  · nsaids and/or tylenol as beneficial  · Instructed to refrain from heavy activity/rest the extremity for the next 24-48 hours  · Discussion regarding possibility of cortisol flare and what to expect if this occurs  · Watch for signs and symptoms of infection  · Call if adverse effect from injection therapy  · Follow up in 6 months for visco injections in 3 months for steroid  · Patient was given instructions and counseling regarding his condition or for health maintenance advice. Please see specific information pulled into the AVS if appropriate.   •     Shayan Donnelly PA-C   Date of Encounter: 2/8/2023   Electronically signed by Shayan Merlos  LADONNA Donnelly, 02/08/23, 2:59 PM EST.      EMR Dragon/Transcription disclaimer:  Much of this encounter note is an electronic transcription/translation of spoken language to printed text. The electronic translation of spoken language may permit erroneous, or at times, nonsensical words or phrases to be inadvertently transcribed; Although I have reviewed the note for such errors, some may still exist.

## 2023-02-14 RX ORDER — LIDOCAINE HYDROCHLORIDE 10 MG/ML
2 INJECTION, SOLUTION EPIDURAL; INFILTRATION; INTRACAUDAL; PERINEURAL
Status: COMPLETED | OUTPATIENT
Start: 2023-02-08 | End: 2023-02-08

## 2023-04-11 ENCOUNTER — TELEPHONE (OUTPATIENT)
Dept: CARDIOLOGY | Facility: CLINIC | Age: 73
End: 2023-04-11

## 2023-04-11 NOTE — TELEPHONE ENCOUNTER
Caller: Leena Israel    Relationship: Self    Best call back number: [phone] - PATIENT IS OKAY WITH A VOICEMAIL    What is the best time to reach you: AFTER 1700 IF POSSIBLE    Do you know the name of the person who called: LEENA ISRAEL    What was the call regarding: PATIENT WOULD LIKE TO ENSURE THAT HIS VA INSURANCE HAS AN UPDATED AUTHORIZATION FOR HIS UPCOMING APPOINTMENT.    Do you require a callback: YES

## 2023-04-13 NOTE — TELEPHONE ENCOUNTER
Caller: Kyler Kauffman    Relationship to patient: Self    Best call back number: [phone]    Patient is needing: PATIENT WANTING TO SPEAK TO LORRI AGAIN ABOUT VA BENEFITS AND APPT. PLEASE RETURN CALL. THANK YOU!

## 2023-04-14 NOTE — TELEPHONE ENCOUNTER
Caller: Leena Israel    Relationship: Self    Best call back number: [phone]    What is the best time to reach you: ANYTIME    Who are you requesting to speak with (clinical staff, provider,  specific staff member): LORRI    Do you know the name of the person who called: LEENA ISRAEL    What was the call regarding: PATIENT BELIEVES THE VA AUTH WAS FOR A DIFFERENT PROVIDER WITHIN [facility]. HE WOULD LIKE TO ENSURE THAT THE VA AUTH IS OKAY FOR HIS SPECIFIC APPOINTMENT WITH .    Do you require a callback: YES

## 2023-04-18 ENCOUNTER — OFFICE VISIT (OUTPATIENT)
Dept: CARDIOLOGY | Facility: CLINIC | Age: 73
End: 2023-04-18
Payer: MEDICARE

## 2023-04-18 VITALS
BODY MASS INDEX: 41.99 KG/M2 | HEART RATE: 86 BPM | SYSTOLIC BLOOD PRESSURE: 121 MMHG | DIASTOLIC BLOOD PRESSURE: 73 MMHG | WEIGHT: 310 LBS | HEIGHT: 72 IN

## 2023-04-18 DIAGNOSIS — E78.2 HYPERLIPEMIA, MIXED: ICD-10-CM

## 2023-04-18 DIAGNOSIS — I10 HYPERTENSION, ESSENTIAL: ICD-10-CM

## 2023-04-18 DIAGNOSIS — E66.01 MORBID (SEVERE) OBESITY DUE TO EXCESS CALORIES: ICD-10-CM

## 2023-04-18 DIAGNOSIS — R06.09 DYSPNEA ON EXERTION: Primary | ICD-10-CM

## 2023-04-18 NOTE — PROGRESS NOTES
Chief Complaint  Shortness of Breath    Subjective            Kyler Kauffman presents to [facility]  History of Present Illness    Mr. Kauffman is here for follow-up evaluation management of dyspnea on exertion, essential hypertension, mixed hyperlipidemia and morbid obesity.  Since last visit he is doing relatively well.  He has knee problems but has been stable.  He denies chest pain palpitations or excessive shortness of breath.  He is compliant with his medical therapy.    [facility]  Past Medical History:   Diagnosis Date   • Back pain    • Diabetes    • Hypertension    • Neck pain    • Neuropathy          SURGICALHX  Past Surgical History:   Procedure Laterality Date   • APPENDECTOMY  1968   • HAND SURGERY  2005        SOC  Social History     Socioeconomic History   • Marital status:    Tobacco Use   • Smoking status: Never   • Smokeless tobacco: Never   Vaping Use   • Vaping Use: Never used   Substance and Sexual Activity   • Alcohol use: Never   • Drug use: Defer   • Sexual activity: Defer         FAMHX  Family History   Problem Relation Age of Onset   • No Known Problems Mother    • No Known Problems Father           ALLERGY  Allergies   Allergen Reactions   • Bee Venom Swelling        MEDSCURRENT    Current Outpatient Medications:   •  albuterol (PROVENTIL) (2.5 MG/3ML) 0.083% nebulizer solution, 2.5 mg Every 8 (Eight) Hours., Disp: , Rfl:   •  aspirin (aspirin) 81 MG EC tablet, Take 1 tablet by mouth Daily., Disp: 90 tablet, Rfl: 11  •  Candesartan Cilexetil-HCTZ 32-25 MG tablet, Take 1 tablet by mouth Daily., Disp: , Rfl:   •  carvedilol (COREG) 3.125 MG tablet, Take 1 tablet by mouth 2 (Two) Times a Day With Meals., Disp: , Rfl:   •  citalopram (CeleXA) 40 MG tablet, , Disp: , Rfl:   •  folic acid-vit B6-vit B12 (FOLTABS) 0.8-10-0.115 MG tablet tablet, Take  by mouth Daily., Disp: , Rfl:   •  furosemide (LASIX) 40 MG tablet, Lasix 40 mg tablet  Take 1 tablet every day  "by oral route., Disp: , Rfl:   •  gabapentin (NEURONTIN) 600 MG tablet, TAKE 1 & 1 2 (ONE & ONE HALF) TABLETS BY MOUTH THREE TIMES DAILY, Disp: , Rfl:   •  glipizide (GLUCOTROL) 10 MG tablet, glipizide ER 10 mg 24 hr tablet,extended release  Take 1 tablet every day by oral route., Disp: , Rfl:   •  JANUVIA 100 MG tablet, Take 1 tablet by mouth Daily., Disp: , Rfl:   •  meloxicam (MOBIC) 7.5 MG tablet, TAKE 1 TABLET BY MOUTH ONCE DAILY AS NEEDED FOR ARTHRITIS PAIN, Disp: , Rfl:   •  metFORMIN (GLUCOPHAGE) 1000 MG tablet, Take 1 tablet by mouth 2 (Two) Times a Day With Meals., Disp: , Rfl:   •  naproxen (NAPROSYN) 375 MG tablet, Take 1 tablet by mouth 2 (Two) Times a Day As Needed for Mild Pain., Disp: , Rfl:   •  omeprazole (priLOSEC) 40 MG capsule, omeprazole 40 mg capsule,delayed release  Take 1 capsule every day by oral route as needed., Disp: , Rfl:   •  potassium chloride (K-DUR,KLOR-CON) 20 MEQ CR tablet, Take 1 tablet by mouth Daily., Disp: , Rfl:   •  tiZANidine (ZANAFLEX) 4 MG tablet, , Disp: , Rfl:       Review of Systems   Cardiovascular: Negative for chest pain and palpitations.   Respiratory: Positive for shortness of breath.    Musculoskeletal: Positive for joint pain.        Objective     /73   Pulse 86   Ht 182.9 cm (72\")   Wt (!) 141 kg (310 lb)   BMI 42.04 kg/m²       General Appearance:   · well developed  · well nourished, morbidly obese  HENT:   · oropharynx moist  · lips not cyanotic  Neck:  · thyroid not enlarged  · supple  Respiratory:  · no respiratory distress  · normal breath sounds  · no rales  Cardiovascular:  · no jugular venous distention  · regular rhythm  · apical impulse normal  · S1 normal, S2 normal  · no S3, no S4   · no murmur  · no rub, no thrill  · carotid pulses normal; no bruit  · pedal pulses normal  · lower extremity edema: none    Musculoskeletal:  · no clubbing of fingers.   · normocephalic, head atraumatic  Skin:   · warm, dry  Psychiatric:  · judgement and " insight appropriate  · normal mood and affect      Result Review :             Data reviewed: Primary care records reviewed     Procedures             Assessment and Plan        ASSESSMENT:  Encounter Diagnoses   Name Primary?   • Dyspnea on exertion Yes   • Hypertension, essential    • Morbid (severe) obesity due to excess calories    • Hyperlipemia, mixed          PLAN:    1.  Dyspnea on exertion- clinically stable, cardiac work-up about 1 year ago was low risk.  I think his obesity and deconditioning plays a large part in his symptoms.  2.  Essential hypertension-stable, continue current medical therapy  3.  Mixed hyperlipidemia-stable, continue statin therapy    Follow-up annually unless problems arise          Patient was given instructions and counseling regarding his condition or for health maintenance advice. Please see specific information pulled into the AVS if appropriate.             EVITA Ortega MD  4/18/2023    10:22 EDT

## 2023-05-08 ENCOUNTER — CLINICAL SUPPORT (OUTPATIENT)
Dept: ORTHOPEDIC SURGERY | Facility: CLINIC | Age: 73
End: 2023-05-08
Payer: OTHER GOVERNMENT

## 2023-05-08 VITALS — WEIGHT: 310 LBS | HEIGHT: 72 IN | BODY MASS INDEX: 41.99 KG/M2

## 2023-05-08 DIAGNOSIS — M17.11 PRIMARY OSTEOARTHRITIS OF RIGHT KNEE: ICD-10-CM

## 2023-05-08 DIAGNOSIS — M17.12 PRIMARY OSTEOARTHRITIS OF LEFT KNEE: ICD-10-CM

## 2023-05-08 RX ORDER — LIDOCAINE HYDROCHLORIDE 10 MG/ML
2 INJECTION, SOLUTION EPIDURAL; INFILTRATION; INTRACAUDAL; PERINEURAL
Status: COMPLETED | OUTPATIENT
Start: 2023-05-08 | End: 2023-05-08

## 2023-05-08 RX ORDER — METHYLPREDNISOLONE ACETATE 40 MG/ML
80 INJECTION, SUSPENSION INTRA-ARTICULAR; INTRALESIONAL; INTRAMUSCULAR; SOFT TISSUE
Status: COMPLETED | OUTPATIENT
Start: 2023-05-08 | End: 2023-05-08

## 2023-05-08 RX ADMIN — LIDOCAINE HYDROCHLORIDE 2 ML: 10 INJECTION, SOLUTION EPIDURAL; INFILTRATION; INTRACAUDAL; PERINEURAL at 14:55

## 2023-05-08 RX ADMIN — METHYLPREDNISOLONE ACETATE 80 MG: 40 INJECTION, SUSPENSION INTRA-ARTICULAR; INTRALESIONAL; INTRAMUSCULAR; SOFT TISSUE at 14:55

## 2023-05-08 NOTE — PROGRESS NOTES
"Chief Complaint  Follow-up of the Left Knee and Follow-up of the Right Knee    Subjective    History of Present Illness      Kyler Kauffman is a 72 y.o. male who presents to [facility] for injection therapy. He receives  BILATERAL knee injections of depomedrol. Since last injection, patient notes substantial relief of symptoms.  His last set of injections was in February with Monovisc.  He is back to alternating Monovisc and Depo-Medrol injections. Treatment options have been discussed and he understands and consents.       Objective   Vital Signs:   Ht 182.9 cm (72\")   Wt (!) 141 kg (310 lb)   BMI 42.04 kg/m²     Physical Exam  72 y.o. male is awake, alert, oriented, in no acute distress and well developed, well nourished.  Ortho Exam   BILATERAL knee  There is moderate joint line tenderness at the medial aspect of both knees.   Positive for crepitus throughout range of motion.   Positive for mild effusion.  Findings are consistent with synovitis and effusion.    Positive patellar grind test.   Negative Lachman test.    Negative anterior and posterior drawer.  Range of motion in extension and flexion is: 5-100 (right), 5-105 (left) degrees.  Neurovascular status is intact.  Dorsalis pedis and posterior tibial artery pulses are palpable.   Common peroneal nerve function is well preserved.   Gait is cautious and antalgic.  Positive for Baker's Cyst with fullness and tenderness in the popliteal fossa.          PROCEDURE  Large Joint Arthrocentesis: R knee  Date/Time: 5/8/2023 2:55 PM  Consent given by: patient  Site marked: site marked  Timeout: Immediately prior to procedure a time out was called to verify the correct patient, procedure, equipment, support staff and site/side marked as required   Supporting Documentation  Indications: pain   Procedure Details  Location: knee - R knee  Preparation: Patient was prepped and draped in the usual sterile fashion  Needle size: 25 " G  Approach: anteromedial  Medications administered: 2 mL lidocaine PF 1% 1 %; 80 mg methylPREDNISolone acetate 40 MG/ML  Patient tolerance: patient tolerated the procedure well with no immediate complications    Large Joint Arthrocentesis: L knee  Date/Time: 5/8/2023 2:55 PM  Consent given by: patient  Site marked: site marked  Timeout: Immediately prior to procedure a time out was called to verify the correct patient, procedure, equipment, support staff and site/side marked as required   Supporting Documentation  Indications: pain   Procedure Details  Location: knee - L knee  Preparation: Patient was prepped and draped in the usual sterile fashion  Needle size: 25 G  Approach: anteromedial  Medications administered: 2 mL lidocaine PF 1% 1 %; 80 mg methylPREDNISolone acetate 40 MG/ML  Patient tolerance: patient tolerated the procedure well with no immediate complications         Injection site was identified by physical examination and cleaned with Betadine and alcohol swabs. Prior to needle insertion, ethyl chloride spray was used for surface anesthesia. Sterile technique was used.       Assessment   Assessment and Plan    Problem List Items Addressed This Visit        Musculoskeletal and Injuries    Primary osteoarthritis of right knee    Relevant Orders    Large Joint Arthrocentesis: R knee    Primary osteoarthritis of left knee    Relevant Orders    Large Joint Arthrocentesis: L knee       Follow Up   · Bilateral knee depomedrol injections discussed with the patient. Discussed indication, risks, benefits, and alternatives. Verbal consent was given to proceed with the procedure.   · Injection was performed from anteromedial approach.  Patient tolerated the procedure well and no complications were encountered.  · Discussion of orthopedic goals and activities and patient/guardian expressed understanding.  · Ice, heat, rest, compression and elevation of extremity as beneficial  · nsaids and/or tylenol as  beneficial  · Instructed to refrain from heavy activity/rest the extremity for the next 24-48 hours  · Discussion regarding possibility of cortisol flare and what to expect if this occurs  · Watch for signs and symptoms of infection  · Call if adverse effect from injection therapy  · Follow up in 6 months for visco injections in 3 months for steroid  · Patient was given instructions and counseling regarding his condition or for health maintenance advice. Please see specific information pulled into the AVS if appropriate.   •     Shayan Donnelly PA-C   Date of Encounter: 5/8/2023   Electronically signed by Shayan Donnelly PA-C, 05/08/23, 2:45 PM EDT.       EMR Dragon/Transcription disclaimer:  Much of this encounter note is an electronic transcription/translation of spoken language to printed text. The electronic translation of spoken language may permit erroneous, or at times, nonsensical words or phrases to be inadvertently transcribed; Although I have reviewed the note for such errors, some may still exist.

## 2023-08-07 ENCOUNTER — TELEPHONE (OUTPATIENT)
Dept: ORTHOPEDIC SURGERY | Facility: CLINIC | Age: 73
End: 2023-08-07
Payer: OTHER GOVERNMENT

## 2023-08-14 ENCOUNTER — CLINICAL SUPPORT (OUTPATIENT)
Dept: ORTHOPEDIC SURGERY | Facility: CLINIC | Age: 73
End: 2023-08-14
Payer: MEDICARE

## 2023-08-14 VITALS — BODY MASS INDEX: 41.99 KG/M2 | WEIGHT: 310 LBS | TEMPERATURE: 97.2 F | HEIGHT: 72 IN

## 2023-08-14 DIAGNOSIS — M17.12 PRIMARY OSTEOARTHRITIS OF LEFT KNEE: ICD-10-CM

## 2023-08-14 DIAGNOSIS — M17.11 PRIMARY OSTEOARTHRITIS OF RIGHT KNEE: Primary | ICD-10-CM

## 2023-08-14 RX ORDER — LIDOCAINE HYDROCHLORIDE 20 MG/ML
2 INJECTION, SOLUTION EPIDURAL; INFILTRATION; INTRACAUDAL; PERINEURAL
Status: COMPLETED | OUTPATIENT
Start: 2023-08-14 | End: 2023-08-14

## 2023-08-14 RX ADMIN — LIDOCAINE HYDROCHLORIDE 2 ML: 20 INJECTION, SOLUTION EPIDURAL; INFILTRATION; INTRACAUDAL; PERINEURAL at 14:45

## 2023-08-14 NOTE — PROGRESS NOTES
"Chief Complaint  Follow-up of the Right Knee and Follow-up of the Left Knee    Subjective    History of Present Illness      Kyler Kauffman is a 72 y.o. male who presents to [facility] for injection therapy. He receives  BILATERAL knee injections of depomedrol. Since last injection, patient notes substantial relief of symptoms.  His last set of injections was in February with Monovisc.  He is back to alternating Monovisc and Depo-Medrol injections. With these injections he reports great relief up until a few weeks before he is due to injections. Treatment options have been discussed and he understands and consents.       Objective   Vital Signs:   Temp 97.2 øF (36.2 øC)   Ht 182 cm (71.65\")   Wt (!) 141 kg (310 lb)   BMI 42.45 kg/mý     Physical Exam  72 y.o. male is awake, alert, oriented, in no acute distress and well developed, well nourished.  Ortho Exam   BILATERAL knee  There is moderate joint line tenderness at the medial aspect of both knees.   Positive for crepitus throughout range of motion.   Positive for mild effusion.  Findings are consistent with synovitis and effusion.    Positive patellar grind test.   Negative Lachman test.    Negative anterior and posterior drawer.  Range of motion in extension and flexion is: 5-100 (right), 5-105 (left) degrees.  Neurovascular status is intact.  Dorsalis pedis and posterior tibial artery pulses are palpable.   Common peroneal nerve function is well preserved.   Gait is cautious and antalgic.  Positive for Baker's Cyst with fullness and tenderness in the popliteal fossa.          PROCEDURE  - Large Joint Arthrocentesis: bilateral knee on 8/14/2023 2:45 PM  Indications: pain  Details: 25 G needle, anteromedial approach  Medications (Right): 88 mg Hyaluronan 88 MG/4ML; 2 mL lidocaine PF 2% 2 %  Medications (Left): 88 mg Hyaluronan 88 MG/4ML; 2 mL lidocaine PF 2% 2 %  Outcome: tolerated well, no immediate complications  Procedure, " treatment alternatives, risks and benefits explained, specific risks discussed. Consent was given by the patient. Immediately prior to procedure a time out was called to verify the correct patient, procedure, equipment, support staff and site/side marked as required. Patient was prepped and draped in the usual sterile fashion.        Injection site was identified by physical examination and cleaned with Betadine and alcohol swabs. Prior to needle insertion, ethyl chloride spray was used for surface anesthesia. Sterile technique was used.       Assessment   Assessment and Plan    Problem List Items Addressed This Visit          Musculoskeletal and Injuries    Primary osteoarthritis of right knee - Primary    Relevant Orders    - Large Joint Arthrocentesis: bilateral knee    Visco Treatment    Primary osteoarthritis of left knee    Relevant Orders    - Large Joint Arthrocentesis: bilateral knee    Visco Treatment     Follow Up   Bilateral knee depomedrol injections discussed with the patient. Discussed indication, risks, benefits, and alternatives. Verbal consent was given to proceed with the procedure.   Injection was performed from anteromedial approach.  Patient tolerated the procedure well and no complications were encountered.  Discussion of orthopedic goals and activities and patient/guardian expressed understanding.  Ice, heat, rest, compression and elevation of extremity as beneficial  nsaids and/or tylenol as beneficial  Instructed to refrain from heavy activity/rest the extremity for the next 24-48 hours  Discussion regarding possibility of cortisol flare and what to expect if this occurs  Watch for signs and symptoms of infection  Call if adverse effect from injection therapy  Follow up in 6 months for visco injections in 3 months for steroid  Patient was given instructions and counseling regarding his condition or for health maintenance advice. Please see specific information pulled into the AVS if  appropriate.       Shayan Donnelly PA-C   Date of Encounter: 8/14/2023   Electronically signed by Shayan Donnelly PA-C, 08/14/23, 3:19 PM EDT.       EMR Dragon/Transcription disclaimer:  Much of this encounter note is an electronic transcription/translation of spoken language to printed text. The electronic translation of spoken language may permit erroneous, or at times, nonsensical words or phrases to be inadvertently transcribed; Although I have reviewed the note for such errors, some may still exist.

## 2023-11-22 ENCOUNTER — CLINICAL SUPPORT (OUTPATIENT)
Dept: ORTHOPEDIC SURGERY | Facility: CLINIC | Age: 73
End: 2023-11-22
Payer: MEDICARE

## 2023-11-22 VITALS — HEIGHT: 72 IN | BODY MASS INDEX: 41.99 KG/M2 | WEIGHT: 310 LBS | TEMPERATURE: 98.6 F

## 2023-11-22 DIAGNOSIS — M17.11 PRIMARY OSTEOARTHRITIS OF RIGHT KNEE: Primary | ICD-10-CM

## 2023-11-22 DIAGNOSIS — M17.12 PRIMARY OSTEOARTHRITIS OF LEFT KNEE: ICD-10-CM

## 2023-11-22 RX ORDER — LIDOCAINE HYDROCHLORIDE 10 MG/ML
2 INJECTION, SOLUTION EPIDURAL; INFILTRATION; INTRACAUDAL; PERINEURAL
Status: COMPLETED | OUTPATIENT
Start: 2023-11-22 | End: 2023-11-22

## 2023-11-22 RX ORDER — METHYLPREDNISOLONE ACETATE 80 MG/ML
160 INJECTION, SUSPENSION INTRA-ARTICULAR; INTRALESIONAL; INTRAMUSCULAR; SOFT TISSUE
Status: COMPLETED | OUTPATIENT
Start: 2023-11-22 | End: 2023-11-22

## 2023-11-22 RX ADMIN — METHYLPREDNISOLONE ACETATE 160 MG: 80 INJECTION, SUSPENSION INTRA-ARTICULAR; INTRALESIONAL; INTRAMUSCULAR; SOFT TISSUE at 14:07

## 2023-11-22 RX ADMIN — LIDOCAINE HYDROCHLORIDE 2 ML: 10 INJECTION, SOLUTION EPIDURAL; INFILTRATION; INTRACAUDAL; PERINEURAL at 14:07

## 2023-11-22 NOTE — PROGRESS NOTES
"Chief Complaint  Follow-up and Pain of the Left Knee    Subjective    History of Present Illness      Kyler Kauffman is a 73 y.o. male who presents to [facility] for injection therapy. He receives  BILATERAL knee injections of depomedrol. Since last injection, patient notes moderate relief of symptoms.He reports this last injection did not last as long as usual.  He is alternating Monovisc and Depo-Medrol injections. With these injections he reports great relief up until a few weeks before he is due to injections. Treatment options have been discussed and he understands and consents. He is also considering a knee replacement for the right knee.      Objective   Vital Signs:   Temp 98.6 °F (37 °C)   Ht 182 cm (71.65\")   Wt (!) 141 kg (310 lb)   BMI 42.46 kg/m²     Physical Exam  73 y.o. male is awake, alert, oriented, in no acute distress and well developed, well nourished.  Ortho Exam   BILATERAL knee  There is moderate joint line tenderness at the medial aspect of both knees.   Positive for crepitus throughout range of motion.   Positive for mild effusion.  Findings are consistent with synovitis and effusion.    Positive patellar grind test.   Negative Lachman test.    Negative anterior and posterior drawer.  Range of motion in extension and flexion is: 5-100 (right), 5-105 (left) degrees.  Neurovascular status is intact.  Dorsalis pedis and posterior tibial artery pulses are palpable.   Common peroneal nerve function is well preserved.   Gait is cautious and antalgic.  Positive for Baker's Cyst with fullness and tenderness in the popliteal fossa.          PROCEDURE  - Large Joint Arthrocentesis: bilateral knee on 11/22/2023 2:07 PM  Indications: pain  Details: 25 G needle, anteromedial approach  Medications (Right): 2 mL lidocaine PF 1% 1 %; 160 mg methylPREDNISolone acetate 80 MG/ML  Medications (Left): 160 mg methylPREDNISolone acetate 80 MG/ML; 2 mL lidocaine PF 1% 1 " %  Outcome: tolerated well, no immediate complications  Procedure, treatment alternatives, risks and benefits explained, specific risks discussed. Consent was given by the patient. Immediately prior to procedure a time out was called to verify the correct patient, procedure, equipment, support staff and site/side marked as required. Patient was prepped and draped in the usual sterile fashion.          Injection site was identified by physical examination and cleaned with Betadine and alcohol swabs. Prior to needle insertion, ethyl chloride spray was used for surface anesthesia. Sterile technique was used.       Assessment   Assessment and Plan    Problem List Items Addressed This Visit          Musculoskeletal and Injuries    Primary osteoarthritis of right knee - Primary    Relevant Orders    - Large Joint Arthrocentesis: bilateral knee    Primary osteoarthritis of left knee    Relevant Orders    - Large Joint Arthrocentesis: bilateral knee       Follow Up   Bilateral knee depomedrol injections discussed with the patient. Discussed indication, risks, benefits, and alternatives. Verbal consent was given to proceed with the procedure.   Injection was performed from anteromedial approach.  Patient tolerated the procedure well and no complications were encountered.  Discussion of orthopedic goals and activities and patient/guardian expressed understanding.  Ice, heat, rest, compression and elevation of extremity as beneficial  nsaids and/or tylenol as beneficial  Instructed to refrain from heavy activity/rest the extremity for the next 24-48 hours  Discussion regarding possibility of cortisol flare and what to expect if this occurs  Watch for signs and symptoms of infection  Call if adverse effect from injection therapy  Follow up in 6 months for steroid injections in 3 months for monovisc  Patient was given instructions and counseling regarding his condition or for health maintenance advice. Please see specific  information pulled into the AVS if appropriate.       Shayan Donnelly PA-C   Date of Encounter: 11/22/2023   Electronically signed by Shayan Donnelly PA-C, 11/22/23, 2:14 PM EST.       EMR Dragon/Transcription disclaimer:  Much of this encounter note is an electronic transcription/translation of spoken language to printed text. The electronic translation of spoken language may permit erroneous, or at times, nonsensical words or phrases to be inadvertently transcribed; Although I have reviewed the note for such errors, some may still exist.

## 2024-04-23 ENCOUNTER — OFFICE VISIT (OUTPATIENT)
Dept: CARDIOLOGY | Facility: CLINIC | Age: 74
End: 2024-04-23
Payer: MEDICARE

## 2024-04-23 VITALS
WEIGHT: 295 LBS | BODY MASS INDEX: 39.96 KG/M2 | SYSTOLIC BLOOD PRESSURE: 142 MMHG | DIASTOLIC BLOOD PRESSURE: 82 MMHG | HEIGHT: 72 IN | HEART RATE: 75 BPM

## 2024-04-23 DIAGNOSIS — E66.01 MORBID (SEVERE) OBESITY DUE TO EXCESS CALORIES: ICD-10-CM

## 2024-04-23 DIAGNOSIS — R06.09 DYSPNEA ON EXERTION: ICD-10-CM

## 2024-04-23 DIAGNOSIS — I10 HYPERTENSION, ESSENTIAL: Primary | ICD-10-CM

## 2024-04-23 RX ORDER — BUPROPION HYDROCHLORIDE 300 MG/1
1 TABLET ORAL DAILY
COMMUNITY
Start: 2024-01-07

## 2024-04-23 RX ORDER — FAMOTIDINE 40 MG/1
40 TABLET, FILM COATED ORAL
COMMUNITY
Start: 2024-02-24

## 2024-04-23 NOTE — PROGRESS NOTES
Chief Complaint  Hypertension and Follow-up (Feeling better than he has for a long time)    Subjective            Kyler Kauffman presents to [facility]      Mr. Kauffman is here for follow-up evaluation management of dyspnea on exertion, essential hypertension, and morbid obesity.  He reports currently that he is feeling better than he has in 20 years.  He denies chest pain or excessive shortness of breath or edema.  He is compliant with his medical therapy.    PMH  Past Medical History:   Diagnosis Date    Back pain     Diabetes     Hypertension     Neck pain     Neuropathy          SURGICALHX  Past Surgical History:   Procedure Laterality Date    APPENDECTOMY  1968    HAND SURGERY  2005        SOC  Social History     Socioeconomic History    Marital status:    Tobacco Use    Smoking status: Never    Smokeless tobacco: Never   Vaping Use    Vaping status: Never Used   Substance and Sexual Activity    Alcohol use: Never    Drug use: Defer    Sexual activity: Defer         FAMHX  Family History   Problem Relation Age of Onset    No Known Problems Mother     No Known Problems Father           ALLERGY  Allergies   Allergen Reactions    Bee Venom Swelling        MEDSCURRENT    Current Outpatient Medications:     albuterol (PROVENTIL) (2.5 MG/3ML) 0.083% nebulizer solution, 2.5 mg Every 8 (Eight) Hours., Disp: , Rfl:     aspirin (aspirin) 81 MG EC tablet, Take 1 tablet by mouth Daily., Disp: 90 tablet, Rfl: 11    buPROPion XL (WELLBUTRIN XL) 300 MG 24 hr tablet, Take 1 tablet by mouth Daily., Disp: , Rfl:     Candesartan Cilexetil-HCTZ 32-25 MG tablet, Take 1 tablet by mouth Daily., Disp: , Rfl:     carvedilol (COREG) 3.125 MG tablet, Take 1 tablet by mouth 2 (Two) Times a Day With Meals., Disp: , Rfl:     citalopram (CeleXA) 40 MG tablet, , Disp: , Rfl:     famotidine (PEPCID) 40 MG tablet, Take 1 tablet by mouth every night at bedtime., Disp: , Rfl:     folic acid-vit B6-vit  "B12 (FOLTABS) 0.8-10-0.115 MG tablet tablet, Take  by mouth Daily., Disp: , Rfl:     furosemide (LASIX) 40 MG tablet, Lasix 40 mg tablet  Take 1 tablet every day by oral route., Disp: , Rfl:     gabapentin (NEURONTIN) 600 MG tablet, TAKE 1 & 1 2 (ONE & ONE HALF) TABLETS BY MOUTH THREE TIMES DAILY, Disp: , Rfl:     glipizide (GLUCOTROL) 10 MG tablet, glipizide ER 10 mg 24 hr tablet,extended release  Take 1 tablet every day by oral route., Disp: , Rfl:     JANUVIA 100 MG tablet, Take 1 tablet by mouth Daily., Disp: , Rfl:     meloxicam (MOBIC) 7.5 MG tablet, TAKE 1 TABLET BY MOUTH ONCE DAILY AS NEEDED FOR ARTHRITIS PAIN, Disp: , Rfl:     metFORMIN (GLUCOPHAGE) 1000 MG tablet, Take 1 tablet by mouth 2 (Two) Times a Day With Meals., Disp: , Rfl:     naproxen (NAPROSYN) 375 MG tablet, Take 1 tablet by mouth 2 (Two) Times a Day As Needed for Mild Pain., Disp: , Rfl:     omeprazole (priLOSEC) 40 MG capsule, omeprazole 40 mg capsule,delayed release  Take 1 capsule every day by oral route as needed., Disp: , Rfl:     potassium chloride (K-DUR,KLOR-CON) 20 MEQ CR tablet, Take 1 tablet by mouth Daily., Disp: , Rfl:     tiZANidine (ZANAFLEX) 4 MG tablet, , Disp: , Rfl:       Review of Systems   Cardiovascular:  Negative for chest pain and palpitations.   Respiratory:  Negative for shortness of breath.    Musculoskeletal:  Positive for back pain and joint pain.        Objective     /82   Pulse 75   Ht 182.9 cm (72\")   Wt 134 kg (295 lb)   BMI 40.01 kg/m²       General Appearance:   well developed  well nourished, morbidly obese  HENT:   oropharynx moist  lips not cyanotic  Neck:  thyroid not enlarged  supple  Respiratory:  no respiratory distress  normal breath sounds  no rales  Cardiovascular:  no jugular venous distention  regular rhythm  apical impulse normal  S1 normal, S2 normal  no S3, no S4   no murmur  no rub, no thrill  carotid pulses normal; no bruit  pedal pulses normal  lower extremity edema: none  "   Musculoskeletal:  no clubbing of fingers.   normocephalic, head atraumatic  Skin:   warm, dry  Psychiatric:  judgement and insight appropriate  normal mood and affect      Result Review :             Data reviewed: Primary care records reviewed      Procedures             Assessment and Plan        ASSESSMENT:  Encounter Diagnoses   Name Primary?    Hypertension, essential Yes    Morbid (severe) obesity due to excess calories     Dyspnea on exertion          PLAN:    1.  Dyspnea on exertion- clinically stable, currently improved.  2.  Essential hypertension-stable based on home readings, continue current medical therapy  3.  Morbid obesity-weight loss counseling    Follow-up annually unless problems arise          Patient was given instructions and counseling regarding his condition or for health maintenance advice. Please see specific information pulled into the AVS if appropriate.             EVITA Ortega MD  4/23/2024    09:11 EDT

## 2025-02-25 ENCOUNTER — OFFICE VISIT (OUTPATIENT)
Dept: CARDIOLOGY | Facility: CLINIC | Age: 75
End: 2025-02-25
Payer: MEDICARE

## 2025-02-25 VITALS
WEIGHT: 300 LBS | HEIGHT: 72 IN | SYSTOLIC BLOOD PRESSURE: 118 MMHG | OXYGEN SATURATION: 94 % | BODY MASS INDEX: 40.63 KG/M2 | DIASTOLIC BLOOD PRESSURE: 62 MMHG | HEART RATE: 94 BPM

## 2025-02-25 DIAGNOSIS — I69.322 CVA, OLD, DYSARTHRIA: Primary | ICD-10-CM

## 2025-02-25 RX ORDER — TIRZEPATIDE 5 MG/.5ML
INJECTION, SOLUTION SUBCUTANEOUS
COMMUNITY

## 2025-02-25 RX ORDER — ATORVASTATIN CALCIUM 40 MG/1
40 TABLET, FILM COATED ORAL
COMMUNITY
Start: 2025-01-02

## 2025-02-25 RX ORDER — ERGOCALCIFEROL 1.25 MG/1
50000 CAPSULE ORAL WEEKLY
COMMUNITY

## 2025-02-25 NOTE — PROGRESS NOTES
Cardiac Electrophysiology Outpatient Consult Note            [facility]    Consult Note     Kyler Kauffman  [id number]  02/25/2025  [phone]     Primary Care Physician: Fernanda Barnett MD    Referred By: Twin Estrada APRN    Subjective     Chief Complaint:   Diagnoses and all orders for this visit:    1. CVA, old, dysarthria (Primary)      Chief Complaint   Patient presents with    Heart Problem       History of Present Illness:   Kyler Kauffman is a 74 y.o. male who presents to my electrophysiology clinic for evaluation of above.      Past Medical History:   Past Medical History:   Diagnosis Date    Back pain     Diabetes     Hypertension     Neck pain     Neuropathy     Stroke        Past Surgical History:   Past Surgical History:   Procedure Laterality Date    APPENDECTOMY  1968    HAND SURGERY  2005    THROMBECTOMY  11/02/2024       Family History:   Family History   Problem Relation Age of Onset    No Known Problems Mother     No Known Problems Father        Social History:   Social History     Socioeconomic History    Marital status:    Tobacco Use    Smoking status: Never    Smokeless tobacco: Never   Vaping Use    Vaping status: Never Used   Substance and Sexual Activity    Alcohol use: Never    Drug use: Defer    Sexual activity: Defer       Medications:     Current Outpatient Medications:     albuterol (PROVENTIL) (2.5 MG/3ML) 0.083% nebulizer solution, 2.5 mg Every 8 (Eight) Hours., Disp: , Rfl:     aspirin (aspirin) 81 MG EC tablet, Take 1 tablet by mouth Daily., Disp: 90 tablet, Rfl: 11    atorvastatin (LIPITOR) 40 MG tablet, Take 1 tablet by mouth every night at bedtime., Disp: , Rfl:     buPROPion XL (WELLBUTRIN XL) 300 MG 24 hr tablet, Take 1 tablet by mouth Daily., Disp: , Rfl:     Candesartan Cilexetil-HCTZ 32-25 MG tablet, Take 1 tablet by mouth Daily., Disp: , Rfl:     carvedilol (COREG) 3.125 MG tablet, Take 1 tablet by mouth 2  (Two) Times a Day With Meals., Disp: , Rfl:     citalopram (CeleXA) 40 MG tablet, , Disp: , Rfl:     cyanocobalamin (VITAMIN B-12) 1000 MCG tablet, Take 1 tablet by mouth Daily., Disp: , Rfl:     ergocalciferol (ERGOCALCIFEROL) 1.25 MG (11706 UT) capsule, Take 1 capsule by mouth 1 (One) Time Per Week., Disp: , Rfl:     famotidine (PEPCID) 40 MG tablet, Take 1 tablet by mouth every night at bedtime., Disp: , Rfl:     folic acid-vit B6-vit B12 (FOLTABS) 0.8-10-0.115 MG tablet tablet, Take  by mouth Daily., Disp: , Rfl:     furosemide (LASIX) 40 MG tablet, Lasix 40 mg tablet  Take 1 tablet every day by oral route., Disp: , Rfl:     gabapentin (NEURONTIN) 600 MG tablet, TAKE 1 & 1 2 (ONE & ONE HALF) TABLETS BY MOUTH THREE TIMES DAILY, Disp: , Rfl:     glipizide (GLUCOTROL) 10 MG tablet, glipizide ER 10 mg 24 hr tablet,extended release  Take 1 tablet every day by oral route., Disp: , Rfl:     JANUVIA 100 MG tablet, Take 1 tablet by mouth Daily., Disp: , Rfl:     meloxicam (MOBIC) 7.5 MG tablet, TAKE 1 TABLET BY MOUTH ONCE DAILY AS NEEDED FOR ARTHRITIS PAIN, Disp: , Rfl:     metFORMIN (GLUCOPHAGE) 1000 MG tablet, Take 1 tablet by mouth 2 (Two) Times a Day With Meals., Disp: , Rfl:     naproxen (NAPROSYN) 375 MG tablet, Take 1 tablet by mouth 2 (Two) Times a Day As Needed for Mild Pain., Disp: , Rfl:     omeprazole (priLOSEC) 40 MG capsule, omeprazole 40 mg capsule,delayed release  Take 1 capsule every day by oral route as needed., Disp: , Rfl:     potassium chloride (K-DUR,KLOR-CON) 20 MEQ CR tablet, Take 1 tablet by mouth Daily., Disp: , Rfl:     Tirzepatide (Mounjaro) 5 MG/0.5ML solution auto-injector, EVERY 7 DAYS for DIABETES, Disp: , Rfl:     tiZANidine (ZANAFLEX) 4 MG tablet, , Disp: , Rfl:     Allergies:   Allergies   Allergen Reactions    Bee Venom Swelling    Semaglutide Diarrhea       Objective   Vital Signs:   Vitals:    02/25/25 1249   BP: 118/62   BP Location: Right arm   Patient Position: Sitting   Pulse: 94  "  SpO2: 94%   Weight: 136 kg (300 lb)   Height: 182.9 cm (72\")       PHYSICAL EXAM  General appearance: Awake, alert, cooperative  Head: Normocephalic, without obvious abnormality, atraumatic  Eyes: Conjunctivae/corneas clear, EOMs intact  Neck: no adenopathy, no carotid bruit, no JVD, and thyroid: not enlarged  Lungs: clear to auscultation bilaterally and no rhonchi or crackles\", ' symmetric  Heart: regular rate and rhythm, S1, S2 normal, no murmur, click, rub or gallop  Abdomen: Soft, non-tender, bowel sounds normal,  no organomegaly  Extremities: extremities normal, atraumatic, no cyanosis or edema  Skin: Skin color, turgor normal, no rashes or lesions  Neurologic: Grossly normal     Lab Results   Component Value Date    CALCIUM 8.9 05/28/2020     08/15/2024    K 4.0 05/28/2020    CO2 29 05/28/2020    CL 98 05/28/2020    BUN 19 05/28/2020    CREATININE 0.6 (L) 05/28/2020    BCR 31.7 05/28/2020     Lab Results   Component Value Date    WBC 8.82 08/15/2024    HGB 8.5 (L) 08/15/2024    HCT 28.8 (L) 08/15/2024    MCV 76 (L) 08/15/2024     08/15/2024     Lab Results   Component Value Date    INR 1.1 08/09/2024     No results found for: \"TSH\", \"R8BVCJL\", \"Q3HOWRS\", \"THYROIDAB\"    Cardiac Testing:      I personally viewed and interpreted the patient's EKG/Telemetry/lab data    Procedures    Tobacco Cessation: N/A  Obstructive Sleep Apnea Screening: Completed    Advance Care Planning   ACP discussion was declined by the patient. Patient does not have an advance directive, declines further assistance.       Assessment & Plan    Diagnoses and all orders for this visit:    1. CVA, old, dysarthria (Primary)         Diagnosis Plan   1. CVA, old, dysarthria  74-year-old  here for unclear reasons to see me in my arrhythmia clinic.    He is a survivor of at least 1 stroke.    He is here with his sister.    He has not seen numerous physicians and does not know why he is here to see me today and indeed is not " "aware of what specialty he is visiting with today either.    He does identify Dr. Ortega as his primary cardiologist and has had a longstanding relationship with him.  He has an appointment to see Dr. Ortega in the near future.    No records available.    No information available from central [facility] scheduling who made this appointment today ( indicating that the patient has \" Heart Disease Unspecified \" ).    All of us would seem to be in the dark.    It is possible that the patient had a cryptogenic stroke.  Patient is not aware of why he had a stroke.  He is not aware of any kind of workup other than somebody gave him a heart rhythm monitor.  He does not have any results.  He thinks he may have the heart rhythm monitor back to somebody in [place].  He tells me that he does not know.    I think in terms of what to do today we will have him follow-up with Dr. Ortega as scheduled.  I do not have much to offer him at this time.    I am sure that Dr. Ortega would consider offering him an implanted loop monitor if appropriate if indeed it is the case that he has had a cryptogenic stroke and initial ambulatory heart rhythm monitoring is nondiagnostic for A-fib.        Body mass index is 40.69 kg/m².    I spent 45 minutes in consultation with this patient which included more than 65% of this time in direct face-to-face counseling, physical examination and discussion of my assessment and findings and this shared decision making with the patient.  The remainder of the time not spent face-to-face was performing one, some or all of the following actions: preparing to see the patient (e.g. reviewing tests, prior clinicians' notes, etc), ordering medications, tests or procedures, coordination of care, discussion of the plan with other healthcare providers, documenting clinical information in epic as well as independently interpreting results and communication of these results to the patient family and/or caregiver(s).  Please " note that this explicitly excludes time spent on other separate billable services such as performing procedures or test interpretation, when applicable.    Follow Up:       Thank you for allowing me to participate in the care of your patient. Please to not hesitate to contact me with additional questions or concerns.      Carlos So DO, FACC, RS  Cardiac Electrophysiologist  [facility]

## 2025-04-29 ENCOUNTER — TELEPHONE (OUTPATIENT)
Dept: CARDIOLOGY | Facility: CLINIC | Age: 75
End: 2025-04-29

## 2025-04-29 ENCOUNTER — OFFICE VISIT (OUTPATIENT)
Dept: CARDIOLOGY | Facility: CLINIC | Age: 75
End: 2025-04-29
Payer: MEDICARE

## 2025-04-29 VITALS
DIASTOLIC BLOOD PRESSURE: 86 MMHG | HEIGHT: 72 IN | BODY MASS INDEX: 38.6 KG/M2 | HEART RATE: 75 BPM | WEIGHT: 285 LBS | SYSTOLIC BLOOD PRESSURE: 151 MMHG

## 2025-04-29 DIAGNOSIS — I10 HYPERTENSION, ESSENTIAL: Primary | ICD-10-CM

## 2025-04-29 DIAGNOSIS — I63.419 CEREBROVASCULAR ACCIDENT (CVA) DUE TO EMBOLISM OF MIDDLE CEREBRAL ARTERY, UNSPECIFIED BLOOD VESSEL LATERALITY: ICD-10-CM

## 2025-04-29 DIAGNOSIS — E78.2 HYPERLIPEMIA, MIXED: ICD-10-CM

## 2025-04-29 PROCEDURE — 1160F RVW MEDS BY RX/DR IN RCRD: CPT | Performed by: INTERNAL MEDICINE

## 2025-04-29 PROCEDURE — 1159F MED LIST DOCD IN RCRD: CPT | Performed by: INTERNAL MEDICINE

## 2025-04-29 PROCEDURE — G2211 COMPLEX E/M VISIT ADD ON: HCPCS | Performed by: INTERNAL MEDICINE

## 2025-04-29 PROCEDURE — 99214 OFFICE O/P EST MOD 30 MIN: CPT | Performed by: INTERNAL MEDICINE

## 2025-04-29 RX ORDER — APIXABAN 5 MG/1
1 TABLET, FILM COATED ORAL EVERY 12 HOURS SCHEDULED
COMMUNITY
Start: 2025-04-01

## 2025-04-29 RX ORDER — NIFEDIPINE 30 MG
1 TABLET, EXTENDED RELEASE ORAL DAILY
COMMUNITY
Start: 2025-03-30